# Patient Record
Sex: MALE | Race: WHITE | NOT HISPANIC OR LATINO | Employment: FULL TIME | URBAN - METROPOLITAN AREA
[De-identification: names, ages, dates, MRNs, and addresses within clinical notes are randomized per-mention and may not be internally consistent; named-entity substitution may affect disease eponyms.]

---

## 2023-04-27 LAB
ALANINE AMINOTRANSFERASE (SGPT) (U/L) IN SER/PLAS: 20 U/L (ref 10–52)
ALBUMIN (G/DL) IN SER/PLAS: 4.3 G/DL (ref 3.4–5)
ALKALINE PHOSPHATASE (U/L) IN SER/PLAS: 38 U/L (ref 33–136)
ANION GAP IN SER/PLAS: 11 MMOL/L (ref 10–20)
ASPARTATE AMINOTRANSFERASE (SGOT) (U/L) IN SER/PLAS: 23 U/L (ref 9–39)
BASOPHILS (10*3/UL) IN BLOOD BY AUTOMATED COUNT: 0.06 X10E9/L (ref 0–0.1)
BASOPHILS/100 LEUKOCYTES IN BLOOD BY AUTOMATED COUNT: 1.2 % (ref 0–2)
BILIRUBIN TOTAL (MG/DL) IN SER/PLAS: 0.4 MG/DL (ref 0–1.2)
CALCIUM (MG/DL) IN SER/PLAS: 8.8 MG/DL (ref 8.6–10.3)
CARBON DIOXIDE, TOTAL (MMOL/L) IN SER/PLAS: 25 MMOL/L (ref 21–32)
CHLORIDE (MMOL/L) IN SER/PLAS: 105 MMOL/L (ref 98–107)
CHOLESTEROL (MG/DL) IN SER/PLAS: 137 MG/DL (ref 0–199)
CHOLESTEROL IN HDL (MG/DL) IN SER/PLAS: 45.7 MG/DL
CHOLESTEROL/HDL RATIO: 3
CREATININE (MG/DL) IN SER/PLAS: 0.88 MG/DL (ref 0.5–1.3)
EOSINOPHILS (10*3/UL) IN BLOOD BY AUTOMATED COUNT: 0.09 X10E9/L (ref 0–0.7)
EOSINOPHILS/100 LEUKOCYTES IN BLOOD BY AUTOMATED COUNT: 1.7 % (ref 0–6)
ERYTHROCYTE DISTRIBUTION WIDTH (RATIO) BY AUTOMATED COUNT: 12.9 % (ref 11.5–14.5)
ERYTHROCYTE MEAN CORPUSCULAR HEMOGLOBIN CONCENTRATION (G/DL) BY AUTOMATED: 32.3 G/DL (ref 32–36)
ERYTHROCYTE MEAN CORPUSCULAR VOLUME (FL) BY AUTOMATED COUNT: 98 FL (ref 80–100)
ERYTHROCYTES (10*6/UL) IN BLOOD BY AUTOMATED COUNT: 4.04 X10E12/L (ref 4.5–5.9)
GFR MALE: >90 ML/MIN/1.73M2
GLUCOSE (MG/DL) IN SER/PLAS: 89 MG/DL (ref 74–99)
HEMATOCRIT (%) IN BLOOD BY AUTOMATED COUNT: 39.6 % (ref 41–52)
HEMOGLOBIN (G/DL) IN BLOOD: 12.8 G/DL (ref 13.5–17.5)
IMMATURE GRANULOCYTES/100 LEUKOCYTES IN BLOOD BY AUTOMATED COUNT: 0.2 % (ref 0–0.9)
LDL: 70 MG/DL (ref 0–99)
LEUKOCYTES (10*3/UL) IN BLOOD BY AUTOMATED COUNT: 5.2 X10E9/L (ref 4.4–11.3)
LYMPHOCYTES (10*3/UL) IN BLOOD BY AUTOMATED COUNT: 1.19 X10E9/L (ref 1.2–4.8)
LYMPHOCYTES/100 LEUKOCYTES IN BLOOD BY AUTOMATED COUNT: 23 % (ref 13–44)
MONOCYTES (10*3/UL) IN BLOOD BY AUTOMATED COUNT: 0.39 X10E9/L (ref 0.1–1)
MONOCYTES/100 LEUKOCYTES IN BLOOD BY AUTOMATED COUNT: 7.5 % (ref 2–10)
NEUTROPHILS (10*3/UL) IN BLOOD BY AUTOMATED COUNT: 3.43 X10E9/L (ref 1.2–7.7)
NEUTROPHILS/100 LEUKOCYTES IN BLOOD BY AUTOMATED COUNT: 66.4 % (ref 40–80)
PLATELETS (10*3/UL) IN BLOOD AUTOMATED COUNT: 183 X10E9/L (ref 150–450)
POTASSIUM (MMOL/L) IN SER/PLAS: 3.7 MMOL/L (ref 3.5–5.3)
PROTEIN TOTAL: 6.4 G/DL (ref 6.4–8.2)
SODIUM (MMOL/L) IN SER/PLAS: 137 MMOL/L (ref 136–145)
THYROTROPIN (MIU/L) IN SER/PLAS BY DETECTION LIMIT <= 0.05 MIU/L: 1.08 MIU/L (ref 0.44–3.98)
TRIGLYCERIDE (MG/DL) IN SER/PLAS: 107 MG/DL (ref 0–149)
UREA NITROGEN (MG/DL) IN SER/PLAS: 15 MG/DL (ref 6–23)
VLDL: 21 MG/DL (ref 0–40)

## 2023-08-07 LAB
CREATININE (MG/DL) IN SER/PLAS: 0.89 MG/DL (ref 0.5–1.3)
GFR MALE: >90 ML/MIN/1.73M2

## 2023-08-08 LAB
ESTIMATED AVERAGE GLUCOSE FOR HBA1C: 103 MG/DL
FOLLITROPIN (IU/L) IN SER/PLAS: 4 IU/L
HEMOGLOBIN A1C/HEMOGLOBIN TOTAL IN BLOOD: 5.2 %
LUTEINIZING HORMONE (IU/ML) IN SER/PLAS: 2.7 IU/L
PROLACTIN (UG/L) IN SER/PLAS: 5.2 UG/L (ref 2–18)
TESTOSTERONE (NG/DL) IN SER/PLAS: 495 NG/DL (ref 240–1000)

## 2023-09-10 PROBLEM — N52.9 ERECTILE DYSFUNCTION: Status: ACTIVE | Noted: 2023-09-10

## 2023-09-10 PROBLEM — E78.2 MIXED HYPERLIPIDEMIA: Status: ACTIVE | Noted: 2023-09-10

## 2023-09-10 PROBLEM — Z85.038 HISTORY OF COLON CANCER: Status: ACTIVE | Noted: 2023-09-10

## 2023-09-10 PROBLEM — H93.13 TINNITUS OF BOTH EARS: Status: ACTIVE | Noted: 2023-09-10

## 2023-09-10 PROBLEM — F41.9 ANXIETY: Status: ACTIVE | Noted: 2023-09-10

## 2023-09-10 PROBLEM — E78.1 HYPERTRIGLYCERIDEMIA: Status: ACTIVE | Noted: 2023-09-10

## 2023-09-10 PROBLEM — K76.0 FATTY LIVER: Status: ACTIVE | Noted: 2023-09-10

## 2023-09-10 PROBLEM — Z86.2 HISTORY OF ANEMIA: Status: ACTIVE | Noted: 2023-09-10

## 2023-09-10 PROBLEM — F40.243 FEAR OF FLYING: Status: ACTIVE | Noted: 2023-09-10

## 2023-09-10 RX ORDER — PRIMIDONE 50 MG/1
TABLET ORAL NIGHTLY
COMMUNITY
End: 2023-10-31 | Stop reason: ALTCHOICE

## 2023-09-10 RX ORDER — FERROUS SULFATE 325(65) MG
65 TABLET ORAL 2 TIMES DAILY
COMMUNITY
End: 2023-12-08 | Stop reason: WASHOUT

## 2023-09-10 RX ORDER — FLUDROCORTISONE ACETATE 0.1 MG/1
0.1 TABLET ORAL 2 TIMES DAILY
COMMUNITY
End: 2023-10-31

## 2023-09-10 RX ORDER — TADALAFIL 20 MG/1
TABLET ORAL
COMMUNITY
Start: 2023-07-25 | End: 2024-05-06 | Stop reason: ALTCHOICE

## 2023-09-10 RX ORDER — ATORVASTATIN CALCIUM 10 MG/1
10 TABLET, FILM COATED ORAL DAILY
COMMUNITY
End: 2024-04-24

## 2023-09-10 RX ORDER — FINASTERIDE 5 MG/1
1 TABLET, FILM COATED ORAL DAILY
COMMUNITY
End: 2023-10-31

## 2023-09-10 RX ORDER — OMEGA-3 FATTY ACIDS 1000 MG
1 CAPSULE ORAL DAILY
COMMUNITY

## 2023-09-10 RX ORDER — VENLAFAXINE HYDROCHLORIDE 150 MG/1
150 CAPSULE, EXTENDED RELEASE ORAL DAILY
COMMUNITY
End: 2023-10-31

## 2023-10-31 ENCOUNTER — TELEMEDICINE (OUTPATIENT)
Dept: UROLOGY | Facility: CLINIC | Age: 63
End: 2023-10-31
Payer: COMMERCIAL

## 2023-10-31 ENCOUNTER — LAB (OUTPATIENT)
Dept: LAB | Facility: LAB | Age: 63
End: 2023-10-31
Payer: COMMERCIAL

## 2023-10-31 VITALS — BODY MASS INDEX: 25.8 KG/M2 | WEIGHT: 190.26 LBS

## 2023-10-31 DIAGNOSIS — N48.6 PEYRONIE'S DISEASE: ICD-10-CM

## 2023-10-31 DIAGNOSIS — N52.9 ERECTILE DYSFUNCTION, UNSPECIFIED ERECTILE DYSFUNCTION TYPE: Primary | ICD-10-CM

## 2023-10-31 DIAGNOSIS — N52.9 ERECTILE DYSFUNCTION, UNSPECIFIED ERECTILE DYSFUNCTION TYPE: ICD-10-CM

## 2023-10-31 LAB
ANION GAP SERPL CALC-SCNC: 15 MMOL/L (ref 10–20)
BUN SERPL-MCNC: 16 MG/DL (ref 6–23)
CALCIUM SERPL-MCNC: 9.4 MG/DL (ref 8.6–10.3)
CHLORIDE SERPL-SCNC: 103 MMOL/L (ref 98–107)
CO2 SERPL-SCNC: 25 MMOL/L (ref 21–32)
CREAT SERPL-MCNC: 0.83 MG/DL (ref 0.5–1.3)
GFR SERPL CREATININE-BSD FRML MDRD: >90 ML/MIN/1.73M*2
GLUCOSE SERPL-MCNC: 85 MG/DL (ref 74–99)
POTASSIUM SERPL-SCNC: 3.7 MMOL/L (ref 3.5–5.3)
SODIUM SERPL-SCNC: 139 MMOL/L (ref 136–145)

## 2023-10-31 PROCEDURE — 36415 COLL VENOUS BLD VENIPUNCTURE: CPT

## 2023-10-31 PROCEDURE — 99214 OFFICE O/P EST MOD 30 MIN: CPT | Performed by: UROLOGY

## 2023-10-31 PROCEDURE — 80048 BASIC METABOLIC PNL TOTAL CA: CPT

## 2023-10-31 RX ORDER — GABAPENTIN 300 MG/1
600 CAPSULE ORAL ONCE
Status: CANCELLED | OUTPATIENT
Start: 2023-10-31 | End: 2023-10-31

## 2023-10-31 RX ORDER — ACETAMINOPHEN 325 MG/1
975 TABLET ORAL ONCE
Status: CANCELLED | OUTPATIENT
Start: 2023-10-31 | End: 2023-10-31

## 2023-10-31 RX ORDER — CELECOXIB 50 MG/1
400 CAPSULE ORAL ONCE
Status: CANCELLED | OUTPATIENT
Start: 2023-10-31 | End: 2023-10-31

## 2023-10-31 RX ORDER — CHLORHEXIDINE GLUCONATE 40 MG/ML
SOLUTION TOPICAL DAILY PRN
Status: CANCELLED | OUTPATIENT
Start: 2023-10-31

## 2023-10-31 RX ORDER — SODIUM CHLORIDE 9 MG/ML
100 INJECTION, SOLUTION INTRAVENOUS CONTINUOUS
Status: CANCELLED | OUTPATIENT
Start: 2023-10-31

## 2023-10-31 NOTE — PROGRESS NOTES
Virtual or Telephone Consent    An interactive audio and video telecommunication system which permits real time communications between the patient (at the originating site) and provider (at the distant site) was utilized to provide this telehealth service.   Verbal consent was requested and obtained from Behzad Gilmore on this date, 10/31/23 for a telehealth visit.     HPI  64 yo M referred by Dr. Patel for erectile dysfunction. S/P vasectomy in 2004. Sp TURP 10/2020.     Last visit 8/30/23:  -IPP counseling  -continue Cialis 20mg prn  -discussed penile plication   -if he responds well to Cialis, penile plication will be a better option  -if not penile implant with modeling is his best option      Todays Visit:  #Erectile Dysfunction   -Cialis 20mg --> not working  -continue with IPP  -okay with blood if needed  -Full code       -Hernia Repair? yes 2004, umbilical repair with mesh  -Pain or curvature in penis when erect: curves to left     Patient states he had colon cancer 2017, after catheter 2 different times for 30 days and has had symptoms ever since     Current Medications:  Current Outpatient Medications   Medication Sig Dispense Refill    atorvastatin (Lipitor) 10 mg tablet Take 1 tablet (10 mg) by mouth once daily.      coenzyme Q10 150 mg capsule Take 1 capsule (150 mg) by mouth once daily. With a meal      ferrous sulfate 325 (65 Fe) MG tablet Take 1 tablet (65 mg of iron) by mouth twice a day.      finasteride (Proscar) 5 mg tablet Take 1 tablet (5 mg) by mouth once daily.      fludrocortisone (Florinef) 0.1 mg tablet Take 1 tablet (0.1 mg) by mouth twice a day.      omega-3 (Super Omega-3) 1,000 mg capsule capsule 1 capsule (1,000 mg) once daily.      primidone (Mysoline) 50 mg tablet Take by mouth once daily at bedtime.      tadalafil 20 mg tablet start with ONE-HALF tablet BY MOUTH as needed 2 (TWO) hours prior to activity. May increase to 1 (ONE) tab as needed 2 (TWO) hours prior to activity.       venlafaxine XR (Effexor XR) 150 mg 24 hr capsule Take 1 capsule (150 mg) by mouth once daily.       No current facility-administered medications for this visit.        PMH:  No past medical history on file.    PSH:  No past surgical history on file.    FMH:  No family history on file.    Allergies:  Allergies   Allergen Reactions    Penicillin Hives and Itching       Physical Exam  Constitutional:       Appearance: Normal appearance.   HENT:      Head: Normocephalic and atraumatic.      Nose: Nose normal.   Pulmonary:      Effort: No respiratory distress.   Abdominal:      General: There is no distension.   Neurological:      Mental Status: He is alert.        Assesment/Plan  Severe ED refractory to ICI and pills, and significant peyronies disease.   We had a long discussion regarding penile prosthesis, including risks, benefits, and alternatives. We discussed risks including but not limited to pain, bleeding, infection, damage to adjacent structures, need for further procedures, malfunction, erosion, extrusion, complications of anesthesia etc. We discussed the postoperative course and expectations, and specifically that after getting an implant, other methods such as injections etc are no longer effective. We also discussed the effect of the implant on length and that it will likely be shorter than previous given age and duration of ED. Further, if he gets an infection and the implant has to be removed, there is potential for him to never have erections again. All questions were answered and reading materials were given.   -continue with coloplast IPP  -penoscrotal approach  -IPP modeling with additional indicated procedures  -vancomycin / gentamicin   -traditional reservoir placement  -will not leave appiah cath in overnight as he has had prior TURP    Follow up with pre-procedural appts    Scribe Attestation  By signing my name below, IJaneth-Jitendra , Scribe   attest that this documentation has been  prepared under the direction and in the presence of Kristen Gross MD.

## 2023-11-30 ENCOUNTER — OFFICE VISIT (OUTPATIENT)
Dept: UROLOGY | Facility: CLINIC | Age: 63
End: 2023-11-30
Payer: COMMERCIAL

## 2023-11-30 VITALS — HEIGHT: 72 IN | WEIGHT: 190 LBS | BODY MASS INDEX: 25.73 KG/M2 | TEMPERATURE: 97.7 F

## 2023-11-30 DIAGNOSIS — N52.9 ERECTILE DYSFUNCTION, UNSPECIFIED ERECTILE DYSFUNCTION TYPE: Primary | ICD-10-CM

## 2023-11-30 PROCEDURE — 99213 OFFICE O/P EST LOW 20 MIN: CPT | Performed by: NURSE PRACTITIONER

## 2023-11-30 PROCEDURE — 1036F TOBACCO NON-USER: CPT | Performed by: NURSE PRACTITIONER

## 2023-11-30 RX ORDER — SULFAMETHOXAZOLE AND TRIMETHOPRIM 800; 160 MG/1; MG/1
1 TABLET ORAL 2 TIMES DAILY
Qty: 14 TABLET | Refills: 0 | Status: SHIPPED | OUTPATIENT
Start: 2023-11-30 | End: 2023-12-14

## 2023-11-30 ASSESSMENT — ENCOUNTER SYMPTOMS
LOSS OF SENSATION IN FEET: 0
DEPRESSION: 0
OCCASIONAL FEELINGS OF UNSTEADINESS: 0

## 2023-11-30 ASSESSMENT — PAIN SCALES - GENERAL: PAINLEVEL: 0-NO PAIN

## 2023-11-30 ASSESSMENT — PATIENT HEALTH QUESTIONNAIRE - PHQ9
2. FEELING DOWN, DEPRESSED OR HOPELESS: NOT AT ALL
1. LITTLE INTEREST OR PLEASURE IN DOING THINGS: NOT AT ALL
SUM OF ALL RESPONSES TO PHQ9 QUESTIONS 1 AND 2: 0

## 2023-11-30 ASSESSMENT — COLUMBIA-SUICIDE SEVERITY RATING SCALE - C-SSRS
6. HAVE YOU EVER DONE ANYTHING, STARTED TO DO ANYTHING, OR PREPARED TO DO ANYTHING TO END YOUR LIFE?: NO
1. IN THE PAST MONTH, HAVE YOU WISHED YOU WERE DEAD OR WISHED YOU COULD GO TO SLEEP AND NOT WAKE UP?: NO
2. HAVE YOU ACTUALLY HAD ANY THOUGHTS OF KILLING YOURSELF?: NO

## 2023-11-30 NOTE — PROGRESS NOTES
[facility]  Outpatient Clinic Note      Patient: Behzad Gilmore  Age/Sex: 63 y.o., male  MRN: [id number]      History of Present Illness  63-year-old gentleman with ED pending IPP with Dr. Kristen Gross. He has no complaints today.    Past Medical & Surgical History  No past medical history on file.   No past surgical history on file.       Labs  NA    Medications:  Current Outpatient Medications on File Prior to Visit   Medication Sig Dispense Refill    atorvastatin (Lipitor) 10 mg tablet Take 1 tablet (10 mg) by mouth once daily.      coenzyme Q10 150 mg capsule Take 1 capsule (150 mg) by mouth once daily. With a meal      ferrous sulfate 325 (65 Fe) MG tablet Take 1 tablet (65 mg of iron) by mouth twice a day.      omega-3 (Super Omega-3) 1,000 mg capsule capsule 1 capsule (1,000 mg) once daily.      tadalafil 20 mg tablet start with ONE-HALF tablet BY MOUTH as needed 2 (TWO) hours prior to activity. May increase to 1 (ONE) tab as needed 2 (TWO) hours prior to activity.       No current facility-administered medications on file prior to visit.          Physical Exam                                                                                                                      General: Well developed, well nourished, alert and cooperative, appears in no acute distress  Eyes: Non-injected conjunctiva, sclera clear, no proptosis  Cardiac: Extremities are warm and well perfused. No edema, cyanosis or pallor.   Lungs: Breathing is easy, non-labored. Speaking in clear and complete sentences. Normal diaphragmatic movement.  MSK: Ambulatory with steady gait, unassisted  Neuro: alert and oriented to person, place and time  Psych: Demonstrates good judgement and reason, without hallucinations, abnormal affect or abnormal behaviors.  Skin: no obvious lesions, no rashes      Review of Systems  Review of Systems       Imaging  NA      Assessment & Plan  63-year-old gentleman with ED pending IPP with   Kristen Gross. He has no complaints today.    We reviewed all pertinent laboratory work and imaging as it pertains to patient's upcoming surgery.  We discussed the risks, benefits and alternatives to patient's upcoming surgery.  We discussed the post operative course including incision care, drain care, and catheter care. We had an opportunity to review IPP including pump, reservoir and cylinders. We reviewed use and indication of preoperative antibiotic, of which he verbalized understanding. All questions were addressed and answered appropriately.  Patient feels comfortable following this teaching visit.        Reviewed and approved by CIRO HOROWITZ on 11/30/23 at 9:12 AM.

## 2023-11-30 NOTE — H&P (VIEW-ONLY)
[facility]  Outpatient Clinic Note      Patient: Behzad Gilmore  Age/Sex: 63 y.o., male  MRN: [id number]      History of Present Illness  63-year-old gentleman with ED pending IPP with Dr. Kristen Gross. He has no complaints today.    Past Medical & Surgical History  No past medical history on file.   No past surgical history on file.       Labs  NA    Medications:  Current Outpatient Medications on File Prior to Visit   Medication Sig Dispense Refill    atorvastatin (Lipitor) 10 mg tablet Take 1 tablet (10 mg) by mouth once daily.      coenzyme Q10 150 mg capsule Take 1 capsule (150 mg) by mouth once daily. With a meal      ferrous sulfate 325 (65 Fe) MG tablet Take 1 tablet (65 mg of iron) by mouth twice a day.      omega-3 (Super Omega-3) 1,000 mg capsule capsule 1 capsule (1,000 mg) once daily.      tadalafil 20 mg tablet start with ONE-HALF tablet BY MOUTH as needed 2 (TWO) hours prior to activity. May increase to 1 (ONE) tab as needed 2 (TWO) hours prior to activity.       No current facility-administered medications on file prior to visit.          Physical Exam                                                                                                                      General: Well developed, well nourished, alert and cooperative, appears in no acute distress  Eyes: Non-injected conjunctiva, sclera clear, no proptosis  Cardiac: Extremities are warm and well perfused. No edema, cyanosis or pallor.   Lungs: Breathing is easy, non-labored. Speaking in clear and complete sentences. Normal diaphragmatic movement.  MSK: Ambulatory with steady gait, unassisted  Neuro: alert and oriented to person, place and time  Psych: Demonstrates good judgement and reason, without hallucinations, abnormal affect or abnormal behaviors.  Skin: no obvious lesions, no rashes      Review of Systems  Review of Systems       Imaging  NA      Assessment & Plan  63-year-old gentleman with ED pending IPP with   Kristen Gross. He has no complaints today.    We reviewed all pertinent laboratory work and imaging as it pertains to patient's upcoming surgery.  We discussed the risks, benefits and alternatives to patient's upcoming surgery.  We discussed the post operative course including incision care, drain care, and catheter care. We had an opportunity to review IPP including pump, reservoir and cylinders. We reviewed use and indication of preoperative antibiotic, of which he verbalized understanding. All questions were addressed and answered appropriately.  Patient feels comfortable following this teaching visit.        Reviewed and approved by CIRO HOROWITZ on 11/30/23 at 9:12 AM.

## 2023-12-07 ENCOUNTER — TELEPHONE (OUTPATIENT)
Dept: PREADMISSION TESTING | Facility: HOSPITAL | Age: 63
End: 2023-12-07
Payer: COMMERCIAL

## 2023-12-08 ENCOUNTER — PRE-ADMISSION TESTING (OUTPATIENT)
Dept: PREADMISSION TESTING | Facility: HOSPITAL | Age: 63
End: 2023-12-08
Payer: COMMERCIAL

## 2023-12-08 ENCOUNTER — LAB (OUTPATIENT)
Dept: LAB | Facility: LAB | Age: 63
End: 2023-12-08
Payer: COMMERCIAL

## 2023-12-08 VITALS
RESPIRATION RATE: 18 BRPM | DIASTOLIC BLOOD PRESSURE: 72 MMHG | OXYGEN SATURATION: 97 % | SYSTOLIC BLOOD PRESSURE: 106 MMHG | TEMPERATURE: 95.9 F | HEIGHT: 71 IN | BODY MASS INDEX: 24.1 KG/M2 | HEART RATE: 66 BPM | WEIGHT: 172.18 LBS

## 2023-12-08 DIAGNOSIS — Z01.818 PREOP TESTING: ICD-10-CM

## 2023-12-08 DIAGNOSIS — Z01.818 PREOP TESTING: Primary | ICD-10-CM

## 2023-12-08 LAB
BASOPHILS # BLD AUTO: 0.06 X10*3/UL (ref 0–0.1)
BASOPHILS NFR BLD AUTO: 1.1 %
EOSINOPHIL # BLD AUTO: 0.11 X10*3/UL (ref 0–0.7)
EOSINOPHIL NFR BLD AUTO: 2 %
ERYTHROCYTE [DISTWIDTH] IN BLOOD BY AUTOMATED COUNT: 12.4 % (ref 11.5–14.5)
HCT VFR BLD AUTO: 42 % (ref 41–52)
HGB BLD-MCNC: 13.9 G/DL (ref 13.5–17.5)
IMM GRANULOCYTES # BLD AUTO: 0.01 X10*3/UL (ref 0–0.7)
IMM GRANULOCYTES NFR BLD AUTO: 0.2 % (ref 0–0.9)
LYMPHOCYTES # BLD AUTO: 1.19 X10*3/UL (ref 1.2–4.8)
LYMPHOCYTES NFR BLD AUTO: 22 %
MCH RBC QN AUTO: 31.1 PG (ref 26–34)
MCHC RBC AUTO-ENTMCNC: 33.1 G/DL (ref 32–36)
MCV RBC AUTO: 94 FL (ref 80–100)
MONOCYTES # BLD AUTO: 0.48 X10*3/UL (ref 0.1–1)
MONOCYTES NFR BLD AUTO: 8.9 %
NEUTROPHILS # BLD AUTO: 3.55 X10*3/UL (ref 1.2–7.7)
NEUTROPHILS NFR BLD AUTO: 65.8 %
NRBC BLD-RTO: 0 /100 WBCS (ref 0–0)
PLATELET # BLD AUTO: 195 X10*3/UL (ref 150–450)
RBC # BLD AUTO: 4.47 X10*6/UL (ref 4.5–5.9)
WBC # BLD AUTO: 5.4 X10*3/UL (ref 4.4–11.3)

## 2023-12-08 PROCEDURE — 87081 CULTURE SCREEN ONLY: CPT | Mod: AHULAB | Performed by: PHYSICIAN ASSISTANT

## 2023-12-08 PROCEDURE — 85025 COMPLETE CBC W/AUTO DIFF WBC: CPT

## 2023-12-08 PROCEDURE — 36415 COLL VENOUS BLD VENIPUNCTURE: CPT

## 2023-12-08 PROCEDURE — 99204 OFFICE O/P NEW MOD 45 MIN: CPT | Performed by: PHYSICIAN ASSISTANT

## 2023-12-08 RX ORDER — CHLORHEXIDINE GLUCONATE ORAL RINSE 1.2 MG/ML
SOLUTION DENTAL
Qty: 473 ML | Refills: 0 | Status: SHIPPED | OUTPATIENT
Start: 2023-12-08 | End: 2024-05-06 | Stop reason: ALTCHOICE

## 2023-12-08 ASSESSMENT — ENCOUNTER SYMPTOMS
NEUROLOGICAL NEGATIVE: 1
EYES NEGATIVE: 1
ENDOCRINE NEGATIVE: 1
CONSTITUTIONAL NEGATIVE: 1
MUSCULOSKELETAL NEGATIVE: 1
RESPIRATORY NEGATIVE: 1
PSYCHIATRIC NEGATIVE: 1
CARDIOVASCULAR NEGATIVE: 1
ALLERGIC/IMMUNOLOGIC NEGATIVE: 1
HEMATOLOGIC/LYMPHATIC NEGATIVE: 1
GASTROINTESTINAL NEGATIVE: 1

## 2023-12-08 NOTE — PREPROCEDURE INSTRUCTIONS
Medication List            Accurate as of December 8, 2023 10:20 AM. Always use your most recent med list.                atorvastatin 10 mg tablet  Commonly known as: Lipitor  Medication Adjustments for Surgery: Take morning of surgery with sip of water, no other fluids     coenzyme Q10 150 mg capsule  Medication Adjustments for Surgery: Stop 7 days before surgery     Super Omega-3 1,000 mg capsule capsule  Generic drug: omega-3  Medication Adjustments for Surgery: Stop 7 days before surgery     tadalafil 20 mg tablet  Commonly known as: Cialis  Medication Adjustments for Surgery: Stop 3 days before surgery                 CONTACT SURGEON'S OFFICE IF YOU DEVELOP:  * Fever = 100.4 F   * New respiratory symptoms (e.g. cough, shortness of breath, respiratory distress, sore throat)  * Recent loss of taste or smell  *Flu like symptoms such as headache, fatigue or gastrointestinal symptoms  * You develop any open sores, shingles, burning or painful urination   AND/OR:  * You no longer wish to have the surgery.  * Any other personal circumstances change that may lead to the need to cancel or defer this surgery.  *You were admitted to any hospital within one week of your planned procedure.    SMOKING:  *Quitting smoking can make a huge difference to your health and recovery from surgery.    *If you need help with quitting, call 7-839-QUIT-NOW.    THE DAY BEFORE SURGERY:  *Do not eat any food after midnight the night before surgery.   *You are permitted to drink clear liquids (i.e. water, black coffee, tea, clear broth, apple juice) up to 2 hours before your surgery.    DIABETICS:  Nothing by mouth (solid or liquid) for 8 hours prior to surgery. Please check fasting blood sugar upon waking up.  If fasting sugar is <80 mg/dl, please drink 100ml/3oz of apple juice no later than 2 hours prior to surgery.      SURGICAL TIME  *You will be contacted between 2 p.m. and 6 p.m. the business day before your surgery with your  arrival time.  *If you haven't received a call by 6pm, call [phone].  *Scheduled surgery times may change and you will be notified if this occurs-check your personal voicemail for any updates.    ON THE MORNING OF SURGERY:  *Wear comfortable, loose fitting clothing.   *Do not use moisturizers, creams, lotions or perfume.  *All jewelry and valuables should be left at home.  *Prosthetic devices such as contact lenses, hearing aids, dentures, eyelash extensions, hairpins and body piercing must be removed before surgery.    BRING WITH YOU:  *Photo ID and insurance card  *Current list of medicines and allergies  *Pacemaker/Defibrillator/Heart stent cards  *CPAP machine and mask  *Slings/splints/crutches  *Copy of your complete Advanced Directive/DHPOA-if applicable  *Neurostimulator implant remote    PARKING AND ARRIVAL:  *Check in at the Main Entrance desk and let them know you are here for surgery.  *You will be directed to the 2nd floor surgical waiting area.    AFTER OUTPATIENT SURGERY:  *A responsible adult MUST accompany you at the time of discharge and stay with you for 24 hours after your surgery.  *You may NOT drive yourself home after surgery.  *You may use a taxi or ride sharing service ([employer], Uber) to return home ONLY if you are accompanied by a friend or family member.  *Instructions for resuming your medications will be provided by your surgeon.

## 2023-12-08 NOTE — CPM/PAT H&P
[facility] Evaluation       Name: Behzad Gilmore (Behzad Gilmore)  /Age: 1960/63 y.o.     In-Person       Chief Complaint: Erectile dysfunction, unspecified erectile dysfunction type [N52.9]  Peyronie's disease     HPI    Date of Consult: 23    Referring Provider: Dr. Gross    Surgery, Date, and Length: Insertion Penile Prosthesis ; 23; 120 minutes    Behzad Gilmore is a 63 year-old male who presents to the [facility] for perioperative risk assessment prior to surgery.  States ED since cancer treatment .  States needed long term catheter during which caused curvature issues.    This note was created in part upon personal review of patient's medical records.      Patient is scheduled to have Insertion Penile Prosthesis     Medical History  Past Medical History:   Diagnosis Date    Anxiety     Colorectal cancer (CMS/HCC) 2016    surgery and chemo    Erectile dysfunction, unspecified erectile dysfunction type     Hyperlipidemia     Peyronie's disease     Vision loss     glasses        STOP BANG = 2    Caprini = 6    Surgical History  Past Surgical History:   Procedure Laterality Date    APPENDECTOMY  2016    COLON SURGERY  2016    righr ascending colon resection    HERNIA REPAIR  2004    umbilical with mesh    OTHER SURGICAL HISTORY Right 1980    knee arthroscopy    PROSTATE SURGERY      TURP    TONSILLECTOMY      2 yo    VASECTOMY            The patient is not a Restoration[employer] and will accept blood and blood products if medically indicated.        Family history:  Family History   Problem Relation Name Age of Onset    Hyperlipidemia Mother      Other (aortic valve replacement) Father      Other (htn) Father      Dementia Father      Alzheimer's disease Sister          Social history:  Social History     Socioeconomic History    Marital status: Single     Spouse name: Not on file    Number of children: Not on file    Years of education: Not on file    Highest education level: Not  "on file   Occupational History    Not on file   Tobacco Use    Smoking status: Never    Smokeless tobacco: Never   Vaping Use    Vaping Use: Never used   Substance and Sexual Activity    Alcohol use: Yes     Comment: once a week    Drug use: Never    Sexual activity: Not on file   Other Topics Concern    Not on file   Social History Narrative    Not on file     Social Determinants of Health     Financial Resource Strain: Not on file   Food Insecurity: Not on file   Transportation Needs: Not on file   Physical Activity: Not on file   Stress: Not on file   Social Connections: Not on file   Intimate Partner Violence: Not on file   Housing Stability: Not on file          Current Outpatient Medications:     atorvastatin (Lipitor) 10 mg tablet, Take 1 tablet (10 mg) by mouth once daily., Disp: , Rfl:     chlorhexidine (Peridex) 0.12 % solution, 15 ml swish and spit for 30 seconds night prior to surgery and morning of surgery, Disp: 473 mL, Rfl: 0    coenzyme Q10 150 mg capsule, Take 1 capsule (150 mg) by mouth once daily. With a meal, Disp: , Rfl:     omega-3 (Super Omega-3) 1,000 mg capsule capsule, 1 capsule (1,000 mg) once daily., Disp: , Rfl:     tadalafil 20 mg tablet, start with ONE-HALF tablet BY MOUTH as needed 2 (TWO) hours prior to activity. May increase to 1 (ONE) tab as needed 2 (TWO) hours prior to activity., Disp: , Rfl:          /72   Pulse 66   Temp 35.5 °C (95.9 °F)   Resp 18   Ht 1.803 m (5' 11\")   Wt 78.1 kg (172 lb 2.9 oz)   SpO2 97%   BMI 24.01 kg/m²      Review of Systems   Constitutional: Negative.    HENT: Negative.     Eyes: Negative.    Respiratory: Negative.     Cardiovascular: Negative.    Gastrointestinal: Negative.    Endocrine: Negative.    Genitourinary: Negative.    Musculoskeletal: Negative.    Skin: Negative.    Allergic/Immunologic: Negative.    Neurological: Negative.    Hematological: Negative.    Psychiatric/Behavioral: Negative.          Physical Exam  Constitutional:  "      Appearance: Normal appearance.   HENT:      Head: Normocephalic and atraumatic.      Right Ear: External ear normal.      Left Ear: External ear normal.      Nose: Nose normal.      Mouth/Throat:      Pharynx: Oropharynx is clear.   Eyes:      Conjunctiva/sclera: Conjunctivae normal.   Cardiovascular:      Rate and Rhythm: Normal rate and regular rhythm.      Heart sounds: Normal heart sounds.   Pulmonary:      Effort: Pulmonary effort is normal.      Breath sounds: Normal breath sounds.   Abdominal:      General: Abdomen is flat.      Palpations: Abdomen is soft.   Musculoskeletal:         General: Normal range of motion.      Cervical back: Normal range of motion and neck supple.   Skin:     General: Skin is warm and dry.   Neurological:      General: No focal deficit present.      Mental Status: He is alert and oriented to person, place, and time.   Psychiatric:         Mood and Affect: Mood normal.         Behavior: Behavior normal.         Thought Content: Thought content normal.         Judgment: Judgment normal.          PAT AIRWAY:   Airway:     Mallampati::  II    Neck ROM::  Full   implants    Lab Results   Component Value Date    WBC 5.4 12/08/2023    HGB 13.9 12/08/2023    HCT 42.0 12/08/2023    MCV 94 12/08/2023     12/08/2023      Lab Results   Component Value Date    GLUCOSE 85 10/31/2023    CALCIUM 9.4 10/31/2023     10/31/2023    K 3.7 10/31/2023    CO2 25 10/31/2023     10/31/2023    BUN 16 10/31/2023    CREATININE 0.83 10/31/2023      RCRI  0  , 3.9 % Risk of MACE    Cardiac  HLD -  continue atorvastatin DOS     Hematology       Patient instructed to ambulate as soon as possible postoperatively to decrease thromboembolic risk.      Initiate mechanical DVT prophylaxis as soon as possible and initiate chemical prophylaxis when deemed safe from a bleeding standpoint post surgery.       VTE prophylaxis per surgical team       Tests ordered in PAT: cbc, bmp, mrsa  LABS REVIEWED:  unremarkable     Risk assessment complete.  Patient is scheduled for a low/intermediate surgical risk procedure.        Preoperative medication instructions were provided and reviewed with the patient.  Any additional testing or evaluation was explained to the patient.  Nothing by mouth instructions were discussed and patient's questions were answered prior to conclusion to this encounter.  Patient verbalized understanding of preoperative instructions given in preadmission testing; discharge instructions available in EMR.    This note was dictated by a speech recognition.  Minor errors may have been detected in a speech recognition.

## 2023-12-10 LAB — STAPHYLOCOCCUS SPEC CULT: ABNORMAL

## 2023-12-14 ENCOUNTER — ANESTHESIA (OUTPATIENT)
Dept: OPERATING ROOM | Facility: HOSPITAL | Age: 63
End: 2023-12-14
Payer: COMMERCIAL

## 2023-12-14 ENCOUNTER — HOSPITAL ENCOUNTER (OUTPATIENT)
Facility: HOSPITAL | Age: 63
Setting detail: OUTPATIENT SURGERY
Discharge: HOME | End: 2023-12-14
Attending: UROLOGY | Admitting: UROLOGY
Payer: COMMERCIAL

## 2023-12-14 ENCOUNTER — ANESTHESIA EVENT (OUTPATIENT)
Dept: OPERATING ROOM | Facility: HOSPITAL | Age: 63
End: 2023-12-14
Payer: COMMERCIAL

## 2023-12-14 VITALS
HEART RATE: 61 BPM | DIASTOLIC BLOOD PRESSURE: 81 MMHG | TEMPERATURE: 97.5 F | RESPIRATION RATE: 16 BRPM | SYSTOLIC BLOOD PRESSURE: 124 MMHG | OXYGEN SATURATION: 99 %

## 2023-12-14 DIAGNOSIS — N52.9 ERECTILE DYSFUNCTION, UNSPECIFIED ERECTILE DYSFUNCTION TYPE: Primary | ICD-10-CM

## 2023-12-14 PROCEDURE — 2500000005 HC RX 250 GENERAL PHARMACY W/O HCPCS: Performed by: NURSE ANESTHETIST, CERTIFIED REGISTERED

## 2023-12-14 PROCEDURE — 54235 NJX CORPORA CAVERNOSA RX AGT: CPT | Performed by: UROLOGY

## 2023-12-14 PROCEDURE — 3600000008 HC OR TIME - EACH INCREMENTAL 1 MINUTE - PROCEDURE LEVEL THREE: Performed by: UROLOGY

## 2023-12-14 PROCEDURE — A54401 PR INSERT SELF-CONTD PENILE PROSTHESIS: Performed by: ANESTHESIOLOGY

## 2023-12-14 PROCEDURE — 54110 TREATMENT OF PENIS LESION: CPT | Performed by: UROLOGY

## 2023-12-14 PROCEDURE — 2500000005 HC RX 250 GENERAL PHARMACY W/O HCPCS: Performed by: UROLOGY

## 2023-12-14 PROCEDURE — 7100000009 HC PHASE TWO TIME - INITIAL BASE CHARGE: Performed by: UROLOGY

## 2023-12-14 PROCEDURE — 3600000003 HC OR TIME - INITIAL BASE CHARGE - PROCEDURE LEVEL THREE: Performed by: UROLOGY

## 2023-12-14 PROCEDURE — C1813 PROSTHESIS, PENILE, INFLATAB: HCPCS | Performed by: UROLOGY

## 2023-12-14 PROCEDURE — 2780000003 HC OR 278 NO HCPCS: Performed by: UROLOGY

## 2023-12-14 PROCEDURE — 3700000001 HC GENERAL ANESTHESIA TIME - INITIAL BASE CHARGE: Performed by: UROLOGY

## 2023-12-14 PROCEDURE — A4217 STERILE WATER/SALINE, 500 ML: HCPCS | Performed by: UROLOGY

## 2023-12-14 PROCEDURE — 2500000004 HC RX 250 GENERAL PHARMACY W/ HCPCS (ALT 636 FOR OP/ED): Performed by: NURSE ANESTHETIST, CERTIFIED REGISTERED

## 2023-12-14 PROCEDURE — 2500000004 HC RX 250 GENERAL PHARMACY W/ HCPCS (ALT 636 FOR OP/ED): Performed by: UROLOGY

## 2023-12-14 PROCEDURE — 3700000002 HC GENERAL ANESTHESIA TIME - EACH INCREMENTAL 1 MINUTE: Performed by: UROLOGY

## 2023-12-14 PROCEDURE — 2500000004 HC RX 250 GENERAL PHARMACY W/ HCPCS (ALT 636 FOR OP/ED): Performed by: ANESTHESIOLOGY

## 2023-12-14 PROCEDURE — 7100000010 HC PHASE TWO TIME - EACH INCREMENTAL 1 MINUTE: Performed by: UROLOGY

## 2023-12-14 PROCEDURE — 2720000007 HC OR 272 NO HCPCS: Performed by: UROLOGY

## 2023-12-14 PROCEDURE — 2500000001 HC RX 250 WO HCPCS SELF ADMINISTERED DRUGS (ALT 637 FOR MEDICARE OP): Performed by: UROLOGY

## 2023-12-14 PROCEDURE — A54401 PR INSERT SELF-CONTD PENILE PROSTHESIS: Performed by: NURSE ANESTHETIST, CERTIFIED REGISTERED

## 2023-12-14 PROCEDURE — 7100000002 HC RECOVERY ROOM TIME - EACH INCREMENTAL 1 MINUTE: Performed by: UROLOGY

## 2023-12-14 PROCEDURE — 7100000001 HC RECOVERY ROOM TIME - INITIAL BASE CHARGE: Performed by: UROLOGY

## 2023-12-14 PROCEDURE — 54360 PENIS PLASTIC SURGERY: CPT | Performed by: UROLOGY

## 2023-12-14 PROCEDURE — 54405 INSERT MULTI-COMP PENIS PROS: CPT | Performed by: UROLOGY

## 2023-12-14 PROCEDURE — 2500000005 HC RX 250 GENERAL PHARMACY W/O HCPCS: Performed by: ANESTHESIOLOGY

## 2023-12-14 DEVICE — IMPLANTABLE DEVICE: Type: IMPLANTABLE DEVICE | Site: PENIS | Status: FUNCTIONAL

## 2023-12-14 DEVICE — RESERVOIR, TITAN CL, WITH LOCK OUT VALVE, 125CC: Type: IMPLANTABLE DEVICE | Site: PENIS | Status: FUNCTIONAL

## 2023-12-14 RX ORDER — LIDOCAINE HYDROCHLORIDE 20 MG/ML
INJECTION, SOLUTION INFILTRATION; PERINEURAL AS NEEDED
Status: DISCONTINUED | OUTPATIENT
Start: 2023-12-14 | End: 2023-12-14

## 2023-12-14 RX ORDER — DEXAMETHASONE SODIUM PHOSPHATE 4 MG/ML
INJECTION, SOLUTION INTRA-ARTICULAR; INTRALESIONAL; INTRAMUSCULAR; INTRAVENOUS; SOFT TISSUE AS NEEDED
Status: DISCONTINUED | OUTPATIENT
Start: 2023-12-14 | End: 2023-12-14

## 2023-12-14 RX ORDER — ACETAMINOPHEN 325 MG/1
975 TABLET ORAL ONCE
Status: COMPLETED | OUTPATIENT
Start: 2023-12-14 | End: 2023-12-14

## 2023-12-14 RX ORDER — SODIUM CHLORIDE 9 MG/ML
100 INJECTION, SOLUTION INTRAVENOUS CONTINUOUS
Status: DISCONTINUED | OUTPATIENT
Start: 2023-12-14 | End: 2023-12-14 | Stop reason: HOSPADM

## 2023-12-14 RX ORDER — GABAPENTIN 300 MG/1
600 CAPSULE ORAL ONCE
Status: COMPLETED | OUTPATIENT
Start: 2023-12-14 | End: 2023-12-14

## 2023-12-14 RX ORDER — PROPOFOL 10 MG/ML
INJECTION, EMULSION INTRAVENOUS AS NEEDED
Status: DISCONTINUED | OUTPATIENT
Start: 2023-12-14 | End: 2023-12-14

## 2023-12-14 RX ORDER — POLYETHYLENE GLYCOL 3350 17 G/17G
17 POWDER, FOR SOLUTION ORAL DAILY
Qty: 7 PACKET | Refills: 0 | Status: SHIPPED | OUTPATIENT
Start: 2023-12-14 | End: 2023-12-21

## 2023-12-14 RX ORDER — SODIUM CHLORIDE, SODIUM LACTATE, POTASSIUM CHLORIDE, CALCIUM CHLORIDE 600; 310; 30; 20 MG/100ML; MG/100ML; MG/100ML; MG/100ML
100 INJECTION, SOLUTION INTRAVENOUS CONTINUOUS
Status: DISCONTINUED | OUTPATIENT
Start: 2023-12-14 | End: 2023-12-14 | Stop reason: HOSPADM

## 2023-12-14 RX ORDER — CHLORHEXIDINE GLUCONATE 40 MG/ML
SOLUTION TOPICAL DAILY PRN
Status: DISCONTINUED | OUTPATIENT
Start: 2023-12-14 | End: 2023-12-14 | Stop reason: HOSPADM

## 2023-12-14 RX ORDER — PHENYLEPHRINE HCL IN 0.9% NACL 0.4MG/10ML
SYRINGE (ML) INTRAVENOUS AS NEEDED
Status: DISCONTINUED | OUTPATIENT
Start: 2023-12-14 | End: 2023-12-14

## 2023-12-14 RX ORDER — MIDAZOLAM HYDROCHLORIDE 1 MG/ML
INJECTION INTRAMUSCULAR; INTRAVENOUS AS NEEDED
Status: DISCONTINUED | OUTPATIENT
Start: 2023-12-14 | End: 2023-12-14

## 2023-12-14 RX ORDER — LIDOCAINE HYDROCHLORIDE 10 MG/ML
INJECTION INFILTRATION; PERINEURAL AS NEEDED
Status: DISCONTINUED | OUTPATIENT
Start: 2023-12-14 | End: 2023-12-14 | Stop reason: HOSPADM

## 2023-12-14 RX ORDER — OXYCODONE HYDROCHLORIDE 5 MG/1
5 TABLET ORAL EVERY 6 HOURS PRN
Qty: 20 TABLET | Refills: 0 | Status: SHIPPED | OUTPATIENT
Start: 2023-12-14 | End: 2023-12-19

## 2023-12-14 RX ORDER — ONDANSETRON HYDROCHLORIDE 2 MG/ML
4 INJECTION, SOLUTION INTRAVENOUS ONCE AS NEEDED
Status: COMPLETED | OUTPATIENT
Start: 2023-12-14 | End: 2023-12-14

## 2023-12-14 RX ORDER — DOCUSATE SODIUM 100 MG/1
100 CAPSULE, LIQUID FILLED ORAL 2 TIMES DAILY PRN
Qty: 14 CAPSULE | Refills: 0 | Status: SHIPPED | OUTPATIENT
Start: 2023-12-14 | End: 2023-12-21

## 2023-12-14 RX ORDER — CELECOXIB 200 MG/1
400 CAPSULE ORAL ONCE
Status: COMPLETED | OUTPATIENT
Start: 2023-12-14 | End: 2023-12-14

## 2023-12-14 RX ORDER — SULFAMETHOXAZOLE AND TRIMETHOPRIM 800; 160 MG/1; MG/1
1 TABLET ORAL 2 TIMES DAILY
Qty: 8 TABLET | Refills: 0 | Status: SHIPPED | OUTPATIENT
Start: 2023-12-14 | End: 2023-12-18

## 2023-12-14 RX ORDER — FENTANYL CITRATE 50 UG/ML
INJECTION, SOLUTION INTRAMUSCULAR; INTRAVENOUS AS NEEDED
Status: DISCONTINUED | OUTPATIENT
Start: 2023-12-14 | End: 2023-12-14

## 2023-12-14 RX ORDER — LIDOCAINE HYDROCHLORIDE 10 MG/ML
0.1 INJECTION, SOLUTION EPIDURAL; INFILTRATION; INTRACAUDAL; PERINEURAL ONCE
Status: DISCONTINUED | OUTPATIENT
Start: 2023-12-14 | End: 2023-12-14 | Stop reason: HOSPADM

## 2023-12-14 RX ORDER — ONDANSETRON HYDROCHLORIDE 2 MG/ML
INJECTION, SOLUTION INTRAVENOUS AS NEEDED
Status: DISCONTINUED | OUTPATIENT
Start: 2023-12-14 | End: 2023-12-14

## 2023-12-14 RX ORDER — BUPIVACAINE HYDROCHLORIDE 2.5 MG/ML
INJECTION, SOLUTION EPIDURAL; INFILTRATION; INTRACAUDAL AS NEEDED
Status: DISCONTINUED | OUTPATIENT
Start: 2023-12-14 | End: 2023-12-14 | Stop reason: HOSPADM

## 2023-12-14 RX ADMIN — ACETAMINOPHEN 975 MG: 325 TABLET ORAL at 08:13

## 2023-12-14 RX ADMIN — LIDOCAINE HYDROCHLORIDE 60 MG: 20 INJECTION, SOLUTION INFILTRATION; PERINEURAL at 10:40

## 2023-12-14 RX ADMIN — DEXAMETHASONE SODIUM PHOSPHATE 4 MG: 4 INJECTION, SOLUTION INTRA-ARTICULAR; INTRALESIONAL; INTRAMUSCULAR; INTRAVENOUS; SOFT TISSUE at 10:44

## 2023-12-14 RX ADMIN — FENTANYL CITRATE 25 MCG: 50 INJECTION, SOLUTION INTRAMUSCULAR; INTRAVENOUS at 12:04

## 2023-12-14 RX ADMIN — HYDROMORPHONE HYDROCHLORIDE 0.5 MG: 1 INJECTION, SOLUTION INTRAMUSCULAR; INTRAVENOUS; SUBCUTANEOUS at 13:32

## 2023-12-14 RX ADMIN — SODIUM CHLORIDE, SODIUM LACTATE, POTASSIUM CHLORIDE, AND CALCIUM CHLORIDE: 600; 310; 30; 20 INJECTION, SOLUTION INTRAVENOUS at 10:31

## 2023-12-14 RX ADMIN — MIDAZOLAM HYDROCHLORIDE 2 MG: 1 INJECTION, SOLUTION INTRAMUSCULAR; INTRAVENOUS at 10:31

## 2023-12-14 RX ADMIN — Medication 100 MCG: at 10:44

## 2023-12-14 RX ADMIN — Medication 100 MCG: at 10:49

## 2023-12-14 RX ADMIN — ONDANSETRON 4 MG: 2 INJECTION, SOLUTION INTRAMUSCULAR; INTRAVENOUS at 10:44

## 2023-12-14 RX ADMIN — Medication 100 MCG: at 11:07

## 2023-12-14 RX ADMIN — CELECOXIB 400 MG: 200 CAPSULE ORAL at 08:14

## 2023-12-14 RX ADMIN — SODIUM CHLORIDE, SODIUM LACTATE, POTASSIUM CHLORIDE, AND CALCIUM CHLORIDE 100 ML/HR: .6; .31; .03; .02 INJECTION, SOLUTION INTRAVENOUS at 12:34

## 2023-12-14 RX ADMIN — ONDANSETRON 4 MG: 2 INJECTION INTRAMUSCULAR; INTRAVENOUS at 16:47

## 2023-12-14 RX ADMIN — FENTANYL CITRATE 25 MCG: 50 INJECTION, SOLUTION INTRAMUSCULAR; INTRAVENOUS at 11:35

## 2023-12-14 RX ADMIN — ACETAMINOPHEN 975 MG: 325 TABLET ORAL at 16:53

## 2023-12-14 RX ADMIN — HYDROMORPHONE HYDROCHLORIDE 0.5 MG: 1 INJECTION, SOLUTION INTRAMUSCULAR; INTRAVENOUS; SUBCUTANEOUS at 13:15

## 2023-12-14 RX ADMIN — PROPOFOL 200 MG: 10 INJECTION, EMULSION INTRAVENOUS at 10:36

## 2023-12-14 RX ADMIN — GENTAMICIN SULFATE 430 MG: 40 INJECTION, SOLUTION INTRAMUSCULAR; INTRAVENOUS at 08:13

## 2023-12-14 RX ADMIN — GABAPENTIN 600 MG: 300 CAPSULE ORAL at 08:14

## 2023-12-14 RX ADMIN — FENTANYL CITRATE 50 MCG: 50 INJECTION, SOLUTION INTRAMUSCULAR; INTRAVENOUS at 10:40

## 2023-12-14 RX ADMIN — Medication 6 L/MIN: at 12:34

## 2023-12-14 RX ADMIN — VANCOMYCIN HYDROCHLORIDE 1500 MG: 10 INJECTION, POWDER, LYOPHILIZED, FOR SOLUTION INTRAVENOUS at 08:13

## 2023-12-14 RX ADMIN — HYDROMORPHONE HYDROCHLORIDE 0.5 MG: 1 INJECTION, SOLUTION INTRAMUSCULAR; INTRAVENOUS; SUBCUTANEOUS at 12:46

## 2023-12-14 SDOH — HEALTH STABILITY: MENTAL HEALTH: CURRENT SMOKER: 0

## 2023-12-14 ASSESSMENT — PAIN - FUNCTIONAL ASSESSMENT
PAIN_FUNCTIONAL_ASSESSMENT: 0-10

## 2023-12-14 ASSESSMENT — PAIN SCALES - GENERAL
PAINLEVEL_OUTOF10: 5 - MODERATE PAIN
PAINLEVEL_OUTOF10: 5 - MODERATE PAIN
PAINLEVEL_OUTOF10: 7
PAINLEVEL_OUTOF10: 5 - MODERATE PAIN
PAINLEVEL_OUTOF10: 7
PAINLEVEL_OUTOF10: 7
PAIN_LEVEL: 0
PAINLEVEL_OUTOF10: 7
PAINLEVEL_OUTOF10: 5 - MODERATE PAIN
PAINLEVEL_OUTOF10: 7
PAINLEVEL_OUTOF10: 7
PAINLEVEL_OUTOF10: 5 - MODERATE PAIN

## 2023-12-14 ASSESSMENT — PAIN DESCRIPTION - LOCATION
LOCATION: PENIS
LOCATION: PENIS

## 2023-12-14 NOTE — ANESTHESIA POSTPROCEDURE EVALUATION
Patient: Behzad Gilmore    Procedure Summary       Date: 12/14/23 Room / Location: [facility] A OR 01 / Virtual [facility] A OR    Anesthesia Start: 1031 Anesthesia Stop: 1239    Procedure: Penile Prosthesis Insertion (Penis) Diagnosis:       Erectile dysfunction, unspecified erectile dysfunction type      Peyronie's disease      (Erectile dysfunction, unspecified erectile dysfunction type [N52.9])      (Peyronie's disease [N48.6])    Surgeons: Kristen Gross MD Responsible Provider: Justina Ramírez MD    Anesthesia Type: general ASA Status: 2            Anesthesia Type: general    Vitals Value Taken Time   /69 12/14/23 1345   Temp 36.2 °C (97.2 °F) 12/14/23 1330   Pulse 69 12/14/23 1345   Resp 15 12/14/23 1345   SpO2 96 % 12/14/23 1345       Anesthesia Post Evaluation    Patient location during evaluation: PACU  Patient participation: complete - patient participated  Level of consciousness: awake  Pain score: 0  Pain management: adequate  Airway patency: patent  Cardiovascular status: acceptable  Respiratory status: acceptable  Hydration status: acceptable  Postoperative Nausea and Vomiting: none        No notable events documented.

## 2023-12-14 NOTE — SIGNIFICANT EVENT
Pt requesting to be cathed at this time after multiple times being unable to void, MD notified bladder scan reading 220

## 2023-12-14 NOTE — SIGNIFICANT EVENT
MD messaged for order to place a appiah to go home with patient, 16fr coude placed at this time 300cc of urine out at this time, pt instructed on home going care for appiah, verbalized understanding, per MD appiah to be removed 12/18 at patient follow up visit, pt made aware

## 2023-12-14 NOTE — ANESTHESIA PREPROCEDURE EVALUATION
Patient: Behzad Gilmore    Procedure Information       Date/Time: 12/14/23 0930    Procedure: Penile Prosthesis Insertion (Penis)    Location: [facility] A OR 01 / Virtual [facility] A OR    Surgeons: Kristen Gross MD            Relevant Problems   Cardiovascular   (+) Hypertriglyceridemia   (+) Mixed hyperlipidemia      /Renal   (+) Fatty liver      Neuro/Psych   (+) Anxiety      GI/Hepatic   (+) Fatty liver       Clinical information reviewed:   Tobacco  Allergies  Meds   Med Hx  Surg Hx   Fam Hx  Soc Hx        NPO Detail:  NPO/Void Status  Date of Last Liquid: 12/14/23  Time of Last Liquid: 0430  Date of Last Solid: 12/13/23  Time of Last Solid: 1800  Last Intake Type: Clear fluids  Time of Last Void: 0430         Physical Exam    Airway  Mallampati: II  TM distance: >3 FB  Neck ROM: full     Cardiovascular - normal exam     Dental    Pulmonary - normal exam     Abdominal - normal exam             Anesthesia Plan    ASA 2     general     The patient is not a current smoker.    intravenous induction   Anesthetic plan and risks discussed with patient.    Plan discussed with CAA and CRNA.

## 2023-12-14 NOTE — ANESTHESIA PROCEDURE NOTES
Airway  Date/Time: 12/14/2023 10:37 AM  Urgency: elective    Airway not difficult    Staffing  Performed: CRNA   Authorized by: Justina Ramírez MD    Performed by: SHADI Bennett-CRNA  Patient location during procedure: OR    Indications and Patient Condition  Indications for airway management: anesthesia  Spontaneous Ventilation: absent  Sedation level: deep  Preoxygenated: yes  Patient position: sniffing  Mask difficulty assessment: 0 - not attempted  Planned trial extubation    Final Airway Details  Final airway type: supraglottic airway      Successful airway: classic  Size 4     Number of attempts at approach: 1

## 2023-12-14 NOTE — DISCHARGE INSTRUCTIONS
DEPARTMENT OF Urology  DISCHARGE INSTRUCTIONS PENILE PROSTHESIS  Outpatient Surgery    C O N F I D E N T I A L   I N F O R M A T I O N    Behzad Gilmore    Call Urology Department [phone] during regular daytime business hours (8:00 am - 5:00 pm) and after 5:00 pm ask for the Urology resident with any questions or concerns.      If it is a life-threatening situation, proceed to the nearest emergency department.        Follow-up appointment:  12/18 follow up for drain removal     Thank you for the opportunity to care for you today.  Your health and healing are very important to us.  We hope we made you feel as comfortable as possible and are committed to your recovery and continued well-being.      The following is a brief overview of your penile prosthesis procedure. Some of the information contained on this summary may be confidential.  This information should be kept in your records and should be shared with your regular doctor.    Physicians:   Dr. Gross    Procedure performed: insertion of penile prosthesis    What to Expect During your Recovery and Home Care  Anesthesia Side Effects   You received general anesthesia today.  You may feel sleepy, tired, or have a sore throat.   You may also feel drowsiness, dizziness, or inability to think clearly.  For your safety, do not drive, drink alcoholic beverages, take any unprescribed medication or make any important decisions for 24 hours.  A responsible adult should be with you for 24 hours.       Activity and Recovery    Go home and rest. No strenuous activity and no heavy lifting over 10 lbs.     Pain Control  Unfortunately, you may experience pain after your procedure.  Adequate management can include alternative measures to help ease your pain and can include ice packs, scrotal support, and over the counter Tylenol. You have also been prescribed Gabapentin and Meloxicam for pain control. Oxycodone may also be prescribe for breakthrough pain. Do not  take more than 4,000 mg of Tylenol in a 24-hour period.     Oxycodone is a narcotic medication, which can impair judgment, slow reaction times, and cause constipation. Take Miralax, Colace, or other stool softeners for constipation. Do not drive or operate machinery while taking narcotic medications.    Antibiotics:  You have been prescribed Bactrim. Please take this twice a day for 7 days.    Nausea/Vomiting   Clear liquids are best tolerated at first. Start slow, advance your diet as tolerated to normal foods. Avoid spicy, greasy, heavy foods at first. Also, you may feel nauseous or like you need to vomit if you take any type of medication on an empty stomach.  Call your physician if you are unable to eat or drink and have persistent vomiting.          Signs of Bleeding   Minor bleeding or drainage may occur from the surgical site; however, excessive or consistent bleeding should be reported to your surgeon. Excessive bleeding is defined as blood that is dripping from wound, soaking you bandages, and is ketchup colored, thick with possible blood clots.  Consistent is defined as bleeding that does not stop.    Treatment/wound care:   Keep area(s) clean and dry. You can wear gauze dressing on top of the incisions, so the scrotal support does not irritate the incisions. It is incredibly important that you keep the scrotum well supported for the first two weeks after the surgery. Continue to wear the scrotal support/jock straps/tight fitting underwear.  If you have a drain in place, we will schedule an appointment for the drain to be removed.  The device is purposefully left partially inflated (approximately 30 to 40 percent). This will be deflated at the two week visit.  Pull the scrotal pump straight down towards the ground in the shower when the scrotal skin is supple. You should also do this every time you go to the bathroom This will allow the pump to heal in a nice dependent position.  It is okay to shower 48  hours after time of surgery.  Do not submerge incisions in water (tub bath, swimming) until incisions have completely healed.  Do not scrub wound(s), pat dry.  Clean with mild soap. Do not use oils or lotions on incisions.    Please visually inspect your wound(s) at least once daily.  If the wound(s) are in a difficult to see location, please use a mirror or have someone else assist with visual inspection.    Signs of Infection  Signs of infection can include fever, excessive swelling, heat, drainage, redness, or severe pain.  If you see any of these occur, please contact [phone]. Any fever higher than 100.4, especially if associated with an ill feeling, abdominal pain, chills, or nausea should be reported to your surgeon.        Additional Instructions:   No sex until discussed further at your follow up appointment. Your device has been left partially inflated, do not deflate, or inflate further. You should wear tight fitting underwear for the next six weeks. At your six week follow up you will be taught how to use your device.

## 2023-12-14 NOTE — OP NOTE
Penile Prosthesis Insertion Operative Note     Date: 2023  OR Location: [facility] A OR    Name: Behzad Gilmore, : 1960, Age: 63 y.o., MRN: [id number], Sex: male    Diagnosis  Pre-op Diagnosis     * Erectile dysfunction, unspecified erectile dysfunction type [N52.9]     * Peyronie's disease [N48.6] Post-op Diagnosis     * Erectile dysfunction, unspecified erectile dysfunction type [N52.9]     * Peyronie's disease [N48.6]     Procedures  Penile Prosthesis Insertion  53502 - NM INSJ MULTI-COMPONENT INFLATABLE PENILE PROSTH    NM NJX C/P/A CAVERNOSA W/PHARMACOLOGIC AGT [32755]  NM PLASTIC RPR PENIS CORRECT ANGULATION [73452] Plastic operation on penis to correct angulation (Penile Modeling)  - CPT 46141 with modifier 59 / ACOSTA - This is a disctinct procedure not typically performed for penile prosthesis placement and carries and additional risk of penile and urethral injury.    Disruption of Peyronie's Plaque with Rossello Dilators and Scratch Technique - CPT 04233 with modifier 59 / ACOSTA - This is a disctinct procedure not typically performed for penile prosthesis placement and carries and additional risk of penile and urethral injury  Surgeons      * Kristen Gross - Primary    Resident/Fellow/Other Assistant:  Surgeon(s) and Role:     * Debbie Madera DO - Resident - Assisting    Procedure Summary  Anesthesia: General  ASA: II  Anesthesia Staff: Anesthesiologist: Justina Ramírez MD  CRNA: SHADI Bennett-CRNA  Estimated Blood Loss: 75 mL  Intra-op Medications: * No intraprocedure medications in log *           Anesthesia Record               Intraprocedure I/O Totals          Intake    LR 1000.00 mL    Total Intake 1000 mL       Output    Urine 200 mL    Est. Blood Loss 10 mL    Total Output 210 mL       Net    Net Volume 790 mL          Specimen: No specimens collected     Staff:   Circulator: Stacey Stewart RN  Relief Circulator: Chitra Daily RN  Relief Scrub: Willis Lyman RN  Scrub  Person: Lois Wolff         Drains and/or Catheters:   Closed/Suction Drain Groin Bulb  (Active)       Urethral Catheter Coude 18 Fr. (Active)       Tourniquet Times:       Findings: Placement of Coloplast Titan OTR 3-piece inflatable penile prosthesis, injection of corpora cavernosa with pharmacologic agent     Cylinders:  20 cm     Rear tips: 1.5 cm bilaterally     Reservoir: 125 mL     Site:  Left space of retzius    Indications: The patient is a 63 y.o. year old male with a history of Peyronie's disease and severe vasculogenic erectile dysfunction refractory to conservative management with PDE 5 inhibitors, VCD, and intracavernosal therapy. Patient decided to undergo definitive surgical management with inflatable penile prosthesis. The risks (infection, erosion, decrease penile size, damage to other organs, device mechanical failure and need for replacement, etc) and benefits were explained and all questions and concerns were addressed.    The patient was seen in the preoperative area. The risks, benefits, complications, treatment options, non-operative alternatives, expected recovery and outcomes were discussed with the patient. The possibilities of reaction to medication, pulmonary aspiration, injury to surrounding structures, bleeding, recurrent infection, the need for additional procedures, failure to diagnose a condition, and creating a complication requiring transfusion or operation were discussed with the patient. The patient concurred with the proposed plan, giving informed consent.  The site of surgery was properly noted/marked if necessary per policy. The patient has been actively warmed in preoperative area. Preoperative antibiotics have been ordered and given within 2 hours of incision. Venous thrombosis prophylaxis have been ordered including bilateral sequential compression devices    Procedure Details: The patient was correctly identified in the preoperative holding area and informed consent  was obtained and documented.  He was examined in the preoperative area to ensure he had no scrotal or perineal rashes or skin infections. He received preoperative antibiotics in the form of vancomycin and gentamicin IV per pharmacy protocol. He was then brought to the operating room and placed on the table in supine position.  After a universal timeout, venodyne boots were placed and after adequate induction, general anesthesia was given. The external genitalia was shaved with clippers making sure not damage the scrotal or penile skin.  A penile block was performed with a total of 10 mL of 0.5% Marcaine  The patient was then prepped with ChloraPrep after a Hibiclens wash and draped in standard sterile fashion.    A 18 [place] coude Curtis catheter was placed sterilely on the field and the balloon inflated with 10 cc of sterile water. A Royal retractor was brought onto the field and the urethral hook was placed at the 12 o'clock position of the meatus and penis was placed on stretch. A 3 cm incision was made sharply with the scalpel approximately one fingerbreadth below the penoscrotal junction in a transverse fashion. All surgeons then changed their top layer of gloves. [place] were then used with the Royal retractor to properly expose the field.      The incision was then brought down to the dartos fascia using Bovie electrocautery layer by layer until the corpora cavernosa were exposed.  2-0 Monocryl holding sutures were placed and corporotomies were made using Bovie electrocautery.  An artificial erection was then induced using injection of 30 cc of 0.25% Marcaine and 20 cc of injectable saline. Dorsal and left curvature noted about 30 degrees. The corpora were easily dilated with the #9 and #12 Brown dilators. Then, disruption of Peyronie's plaque with Rossello Dilators via the scratch technique was performed.  Using the Dilamezinsert the patient's corpora were measured to be 11 cm in the proximal direction and  10.5 cm in the distal direction and we decided to place 20 cm cylinders with 1.5 rear tips.      We then turned our attention to the space of Retzius.  This was bluntly dissected on the left hand side by the surgeon.  The space was created through which we placed the reservoir.  The reservoir was then filled with 85 mL of sterile saline. Copious amounts of antibiotic solution were used during this dissection and placement.    Once this was complete, the penile prosthesis was prepared off the field and brought onto the field in a Dora bag filled with antibiotic solution. The penile prosthesis cylinders were placed first proximally and then distally using a Darryl needle on a Georges passer.  The holding sutures were then tied down to close the corporotomies. Copious amounts of antibiotic solution were used for irrigation during this part of the procedure.      A Mandy clamp and nasal speculum were used to develop a dartos pouch in which the penile prosthesis pump was placed. The pump was placed in the most dependent region of the scrotum and held down.  The tubing was then connected using connectors from the penile prosthesis kit.  Intervening tissue was closed using 2-0 Vicryl suture.  The cylinders were then tested and inflated. Given the presence of persistent curvature penile modeling was then performed resulting in reduction of the curvature to about 10 degrees.     A SKYE drain was placed on the right hand side of the groin through a stab incision and placed dependently over the pump.  The dartos was then closed in a running fashion using a 2-0 Vicryl suture after copious amounts of antibiotic irrigation.  Subcutaneous tissue was then approximated with 2-0 Vicryl in a simple running fashion.The skin was then reapproximated by simple interrupted vertical mattress sutures using 3-0 Monocryl . The wound was then cleaned and dried and Dermabond was applied. The appiah catheter was then removed. Scrotal support and  scrotal fluffs were placed on the patient.  The patient was then awoken from general anesthesia and seemed to tolerate the procedure quite well. He was transferred to the stretcher and brought back to the PACU in stable condition.    Complications:  None; patient tolerated the procedure well.    Disposition: PACU - hemodynamically stable.  Condition: stable     Additional Details: Follow-up 12/18 for drain removal    Attending Attestation:     Kristen Gross  Phone Number: [phone]

## 2023-12-14 NOTE — PERIOPERATIVE NURSING NOTE
1330- Assumed care of patient at this time. Report from PATRICE Carcamo     1332- Medicated patient at this time per orders for pain rating of 7/10. Verified allergies prior to admin     1402- criteria met for patient to discharge from Phase I at this time     1403- PHASE II

## 2023-12-14 NOTE — POST-PROCEDURE NOTE
1415 nurse helps pt getting dressed , family at bedside this time.  1425 Pt ambulated to toilet and can't void this time  1430 Discharge instruction reviewed with pt and family by nurse  1440 Pt ambulated to toilet.

## 2023-12-18 ENCOUNTER — OFFICE VISIT (OUTPATIENT)
Dept: UROLOGY | Facility: CLINIC | Age: 63
End: 2023-12-18
Payer: COMMERCIAL

## 2023-12-18 VITALS — BODY MASS INDEX: 24.08 KG/M2 | WEIGHT: 172 LBS | HEIGHT: 71 IN

## 2023-12-18 DIAGNOSIS — N52.9 ERECTILE DYSFUNCTION, UNSPECIFIED ERECTILE DYSFUNCTION TYPE: ICD-10-CM

## 2023-12-18 DIAGNOSIS — N48.6 PEYRONIE'S DISEASE: ICD-10-CM

## 2023-12-18 PROCEDURE — 1036F TOBACCO NON-USER: CPT | Performed by: NURSE PRACTITIONER

## 2023-12-18 PROCEDURE — 99024 POSTOP FOLLOW-UP VISIT: CPT | Performed by: NURSE PRACTITIONER

## 2023-12-18 ASSESSMENT — COLUMBIA-SUICIDE SEVERITY RATING SCALE - C-SSRS
1. IN THE PAST MONTH, HAVE YOU WISHED YOU WERE DEAD OR WISHED YOU COULD GO TO SLEEP AND NOT WAKE UP?: NO
2. HAVE YOU ACTUALLY HAD ANY THOUGHTS OF KILLING YOURSELF?: NO
6. HAVE YOU EVER DONE ANYTHING, STARTED TO DO ANYTHING, OR PREPARED TO DO ANYTHING TO END YOUR LIFE?: NO

## 2023-12-18 ASSESSMENT — ENCOUNTER SYMPTOMS
OCCASIONAL FEELINGS OF UNSTEADINESS: 0
DEPRESSION: 0
LOSS OF SENSATION IN FEET: 0

## 2023-12-18 ASSESSMENT — PATIENT HEALTH QUESTIONNAIRE - PHQ9
2. FEELING DOWN, DEPRESSED OR HOPELESS: NOT AT ALL
SUM OF ALL RESPONSES TO PHQ9 QUESTIONS 1 AND 2: 0
1. LITTLE INTEREST OR PLEASURE IN DOING THINGS: NOT AT ALL

## 2023-12-18 ASSESSMENT — PAIN SCALES - GENERAL: PAINLEVEL: 0-NO PAIN

## 2024-01-03 ENCOUNTER — OFFICE VISIT (OUTPATIENT)
Dept: UROLOGY | Facility: CLINIC | Age: 64
End: 2024-01-03
Payer: COMMERCIAL

## 2024-01-03 DIAGNOSIS — N52.9 ERECTILE DYSFUNCTION, UNSPECIFIED ERECTILE DYSFUNCTION TYPE: Primary | ICD-10-CM

## 2024-01-03 PROBLEM — Z96.0 S/P INSERTION OF PENILE IMPLANT: Status: ACTIVE | Noted: 2024-01-03

## 2024-01-03 LAB
POC APPEARANCE, URINE: CLEAR
POC BILIRUBIN, URINE: NEGATIVE
POC BLOOD, URINE: NEGATIVE
POC COLOR, URINE: YELLOW
POC GLUCOSE, URINE: NEGATIVE MG/DL
POC KETONES, URINE: ABNORMAL MG/DL
POC LEUKOCYTES, URINE: NEGATIVE
POC NITRITE,URINE: NEGATIVE
POC PH, URINE: 6 PH
POC PROTEIN, URINE: NEGATIVE MG/DL
POC SPECIFIC GRAVITY, URINE: >=1.03
POC UROBILINOGEN, URINE: 0.2 EU/DL

## 2024-01-03 PROCEDURE — 99024 POSTOP FOLLOW-UP VISIT: CPT | Performed by: UROLOGY

## 2024-01-03 PROCEDURE — 1036F TOBACCO NON-USER: CPT | Performed by: UROLOGY

## 2024-01-03 PROCEDURE — 81002 URINALYSIS NONAUTO W/O SCOPE: CPT | Performed by: UROLOGY

## 2024-01-03 NOTE — PROGRESS NOTES
HPI  64 yo M referred by Dr. Patel for erectile dysfunction. S/P vasectomy in 2004. Sp TURP 10/2020.     Last visit 10/31/23:      -continue with coloplast IPP      -penoscrotal approach      -IPP modeling with additional indicated procedures      -vancomycin / gentamicin       -traditional reservoir placement      -will not leave appiah cath in overnight as he has had prior TURP     Todays Visit:  #Erectile Dysfunction   -s/p Coloplast Titan OTR 3-piece inflatable penile prosthesis, left space of retzius, 20cm cylinders, 1.5cm bilateral rear tips, 125ml reservoir 12/14/23  -doing well  -denies blood, pus drainage at incision site       -Hernia Repair? yes 2004, umbilical repair with mesh  -Pain or curvature in penis when erect: curves to left     Patient states he had colon cancer 2017, after catheter 2 different times for 30 days and has had symptoms ever since     Current Medications:  Current Outpatient Medications   Medication Sig Dispense Refill    atorvastatin (Lipitor) 10 mg tablet Take 1 tablet (10 mg) by mouth once daily.      chlorhexidine (Peridex) 0.12 % solution 15 ml swish and spit for 30 seconds night prior to surgery and morning of surgery 473 mL 0    coenzyme Q10 150 mg capsule Take 1 capsule (150 mg) by mouth once daily. With a meal      omega-3 (Super Omega-3) 1,000 mg capsule capsule 1 capsule (1,000 mg) once daily.      tadalafil 20 mg tablet start with ONE-HALF tablet BY MOUTH as needed 2 (TWO) hours prior to activity. May increase to 1 (ONE) tab as needed 2 (TWO) hours prior to activity.       No current facility-administered medications for this visit.        PMH:  Past Medical History:   Diagnosis Date    Anxiety     Colorectal cancer (CMS/HCC) 2016    surgery and chemo    Erectile dysfunction, unspecified erectile dysfunction type     Hyperlipidemia     Peyronie's disease     Vision loss     glasses       PSH:  Past Surgical History:   Procedure Laterality Date    APPENDECTOMY  2016     COLON SURGERY  2016    [facility] ascending colon resection    HERNIA REPAIR  2004    umbilical with mesh    OTHER SURGICAL HISTORY Right 1980    knee arthroscopy    PROSTATE SURGERY  2020    TURP    TONSILLECTOMY      4 yo    VASECTOMY  2002       FMH:  Family History   Problem Relation Name Age of Onset    Hyperlipidemia Mother      Other (aortic valve replacement) Father      Other (htn) Father      Dementia Father      Alzheimer's disease Sister         Allergies:  Allergies   Allergen Reactions    Penicillin Hives and Itching    Penicillins Hives and Rash     Physical Exam  Implant: tips properly placed, inflates and deflates properly   Pump in place, nonfixed, able to inflate and deflate   No signs of infection erosion or extrusion   Appropriate healing of incision     Assesment/Plan  #Severe ED refractory to ICI and pills, and significant peyronies disease.   -s/p Coloplast Titan OTR 3-piece inflatable penile prosthesis, left space of retzius, 20cm cylinders, 1.5cm bilateral rear tips, 125ml reservoir 12/14/23  -healing well  -inflated and deflated today, reviewed pumping instructions    -advised patient to inflate and deflate everyday    Follow up in 3 weeks     Scribe Attestation  By signing my name below, I, Janeth Leary-Jitendra , Scribe   attest that this documentation has been prepared under the direction and in the presence of Kristen Gross MD.

## 2024-01-23 NOTE — PROGRESS NOTES
HPI  64 yo M referred by Dr. Patel for erectile dysfunction. S/P vasectomy in 2004. Sp TURP 10/2020.     Last visit 1/3/23:      -healing well      -inflated and deflated today, reviewed pumping instructions        -advised patient to inflate and deflate everyday     Todays Visit:  #Erectile Dysfunction   -s/p Coloplast Titan OTR 3-piece inflatable penile prosthesis, left space of retzius, 20cm cylinders, 1.5cm bilateral rear tips, 125ml reservoir 12/14/23  - He has been doing well since surgery  - Still doing exercise on implant daily for curvature, he soaks in tub to help  - He is a  and sometimes depending on sitting position the IPP auto-inflates.  - No fever, chills, nausea, vomiting, hematuria    -Hernia Repair? yes 2004, umbilical repair with mesh  -Pain or curvature in penis when erect: curves to left, has improved since placement     Patient states he had colon cancer 2017, after catheter 2 different times for 30 days and has had symptoms ever since     Current Medications:  Current Outpatient Medications   Medication Sig Dispense Refill    atorvastatin (Lipitor) 10 mg tablet Take 1 tablet (10 mg) by mouth once daily.      chlorhexidine (Peridex) 0.12 % solution 15 ml swish and spit for 30 seconds night prior to surgery and morning of surgery 473 mL 0    coenzyme Q10 150 mg capsule Take 1 capsule (150 mg) by mouth once daily. With a meal      omega-3 (Super Omega-3) 1,000 mg capsule capsule 1 capsule (1,000 mg) once daily.      tadalafil 20 mg tablet start with ONE-HALF tablet BY MOUTH as needed 2 (TWO) hours prior to activity. May increase to 1 (ONE) tab as needed 2 (TWO) hours prior to activity.       No current facility-administered medications for this visit.        PMH:  Past Medical History:   Diagnosis Date    Anxiety     Colorectal cancer (CMS/HCC) 2016    surgery and chemo    Erectile dysfunction, unspecified erectile dysfunction type     Hyperlipidemia     Peyronie's disease     Vision  loss     glasses       PSH:  Past Surgical History:   Procedure Laterality Date    APPENDECTOMY  2016    COLON SURGERY  2016    [facility] ascending colon resection    HERNIA REPAIR  2004    umbilical with mesh    OTHER SURGICAL HISTORY Right 1980    knee arthroscopy    PROSTATE SURGERY  2020    TURP    TONSILLECTOMY      4 yo    VASECTOMY  2002       FMH:  Family History   Problem Relation Name Age of Onset    Hyperlipidemia Mother      Other (aortic valve replacement) Father      Other (htn) Father      Dementia Father      Alzheimer's disease Sister         Allergies:  Allergies   Allergen Reactions    Penicillin Hives and Itching    Penicillins Hives and Rash     Physical Exam  Implant: tips properly placed, inflates and deflates properly   Pump in place, nonfixed, able to inflate and deflate   No signs of infection erosion or extrusion   Appropriate healing of incision  Approximately 15 degree curvature residual left and upwards.    Assesment/Plan  #Severe ED refractory to ICI and pills, and significant peyronies disease.   -s/p Coloplast Titan OTR 3-piece inflatable penile prosthesis, left space of retzius, 20cm cylinders, 1.5cm bilateral rear tips, 125ml reservoir 12/14/23  -healing well  -inflated and deflated today, reviewed pumping instructions    -advised patient to inflate and deflate everyday  - no restrictions for sex    Follow up in 3 months     Scribe Attestation  By signing my name below, IJaneth Scribe   attest that this documentation has been prepared under the direction and in the presence of Kristen Gross MD.     Scribe Attestation  By signing my name below, Jessica ANDRADE Scribe attest that this documentation has been prepared under the direction and in the presence of Kristen Gross MD. All medical record entries made by the Scribe were at my direction or personally dictated by me.

## 2024-01-24 ENCOUNTER — OFFICE VISIT (OUTPATIENT)
Dept: UROLOGY | Facility: CLINIC | Age: 64
End: 2024-01-24
Payer: COMMERCIAL

## 2024-01-24 DIAGNOSIS — Z96.0 S/P INSERTION OF PENILE IMPLANT: ICD-10-CM

## 2024-01-24 DIAGNOSIS — N48.6 PEYRONIE'S DISEASE: ICD-10-CM

## 2024-01-24 DIAGNOSIS — N52.9 ERECTILE DYSFUNCTION, UNSPECIFIED ERECTILE DYSFUNCTION TYPE: Primary | ICD-10-CM

## 2024-01-24 PROCEDURE — 99024 POSTOP FOLLOW-UP VISIT: CPT | Performed by: UROLOGY

## 2024-01-24 PROCEDURE — 1036F TOBACCO NON-USER: CPT | Performed by: UROLOGY

## 2024-04-10 ENCOUNTER — TELEPHONE (OUTPATIENT)
Dept: PRIMARY CARE | Facility: CLINIC | Age: 64
End: 2024-04-10
Payer: COMMERCIAL

## 2024-04-10 DIAGNOSIS — E78.2 MIXED HYPERLIPIDEMIA: ICD-10-CM

## 2024-04-10 DIAGNOSIS — Z00.00 ANNUAL PHYSICAL EXAM: ICD-10-CM

## 2024-04-10 DIAGNOSIS — Z12.5 SCREENING FOR PROSTATE CANCER: ICD-10-CM

## 2024-04-13 ENCOUNTER — LAB (OUTPATIENT)
Dept: LAB | Facility: LAB | Age: 64
End: 2024-04-13
Payer: COMMERCIAL

## 2024-04-13 DIAGNOSIS — Z12.5 SCREENING FOR PROSTATE CANCER: ICD-10-CM

## 2024-04-13 DIAGNOSIS — Z00.00 ANNUAL PHYSICAL EXAM: ICD-10-CM

## 2024-04-13 DIAGNOSIS — E78.2 MIXED HYPERLIPIDEMIA: ICD-10-CM

## 2024-04-13 LAB
ALBUMIN SERPL BCP-MCNC: 4.5 G/DL (ref 3.4–5)
ALP SERPL-CCNC: 47 U/L (ref 33–136)
ALT SERPL W P-5'-P-CCNC: 17 U/L (ref 10–52)
ANION GAP SERPL CALC-SCNC: 14 MMOL/L (ref 10–20)
AST SERPL W P-5'-P-CCNC: 19 U/L (ref 9–39)
BASOPHILS # BLD AUTO: 0.07 X10*3/UL (ref 0–0.1)
BASOPHILS NFR BLD AUTO: 1.6 %
BILIRUB SERPL-MCNC: 0.6 MG/DL (ref 0–1.2)
BUN SERPL-MCNC: 13 MG/DL (ref 6–23)
CALCIUM SERPL-MCNC: 9.1 MG/DL (ref 8.6–10.3)
CHLORIDE SERPL-SCNC: 103 MMOL/L (ref 98–107)
CHOLEST SERPL-MCNC: 159 MG/DL (ref 0–199)
CHOLESTEROL/HDL RATIO: 3.1
CO2 SERPL-SCNC: 25 MMOL/L (ref 21–32)
CREAT SERPL-MCNC: 0.95 MG/DL (ref 0.5–1.3)
EGFRCR SERPLBLD CKD-EPI 2021: 89 ML/MIN/1.73M*2
EOSINOPHIL # BLD AUTO: 0.12 X10*3/UL (ref 0–0.7)
EOSINOPHIL NFR BLD AUTO: 2.7 %
ERYTHROCYTE [DISTWIDTH] IN BLOOD BY AUTOMATED COUNT: 12.9 % (ref 11.5–14.5)
GLUCOSE SERPL-MCNC: 95 MG/DL (ref 74–99)
HCT VFR BLD AUTO: 42.2 % (ref 41–52)
HDLC SERPL-MCNC: 52.1 MG/DL
HGB BLD-MCNC: 13.9 G/DL (ref 13.5–17.5)
IMM GRANULOCYTES # BLD AUTO: 0.01 X10*3/UL (ref 0–0.7)
IMM GRANULOCYTES NFR BLD AUTO: 0.2 % (ref 0–0.9)
LDLC SERPL CALC-MCNC: 85 MG/DL
LYMPHOCYTES # BLD AUTO: 1.11 X10*3/UL (ref 1.2–4.8)
LYMPHOCYTES NFR BLD AUTO: 25.2 %
MCH RBC QN AUTO: 30.6 PG (ref 26–34)
MCHC RBC AUTO-ENTMCNC: 32.9 G/DL (ref 32–36)
MCV RBC AUTO: 93 FL (ref 80–100)
MONOCYTES # BLD AUTO: 0.38 X10*3/UL (ref 0.1–1)
MONOCYTES NFR BLD AUTO: 8.6 %
NEUTROPHILS # BLD AUTO: 2.72 X10*3/UL (ref 1.2–7.7)
NEUTROPHILS NFR BLD AUTO: 61.7 %
NON HDL CHOLESTEROL: 107 MG/DL (ref 0–149)
NRBC BLD-RTO: 0 /100 WBCS (ref 0–0)
PLATELET # BLD AUTO: 191 X10*3/UL (ref 150–450)
POTASSIUM SERPL-SCNC: 4 MMOL/L (ref 3.5–5.3)
PROT SERPL-MCNC: 6.9 G/DL (ref 6.4–8.2)
PSA SERPL-MCNC: 0.51 NG/ML
RBC # BLD AUTO: 4.54 X10*6/UL (ref 4.5–5.9)
SODIUM SERPL-SCNC: 138 MMOL/L (ref 136–145)
TRIGL SERPL-MCNC: 108 MG/DL (ref 0–149)
TSH SERPL-ACNC: 1.49 MIU/L (ref 0.44–3.98)
VLDL: 22 MG/DL (ref 0–40)
WBC # BLD AUTO: 4.4 X10*3/UL (ref 4.4–11.3)

## 2024-04-13 PROCEDURE — 84443 ASSAY THYROID STIM HORMONE: CPT

## 2024-04-13 PROCEDURE — 36415 COLL VENOUS BLD VENIPUNCTURE: CPT

## 2024-04-13 PROCEDURE — 80061 LIPID PANEL: CPT

## 2024-04-13 PROCEDURE — 85025 COMPLETE CBC W/AUTO DIFF WBC: CPT

## 2024-04-13 PROCEDURE — 80053 COMPREHEN METABOLIC PANEL: CPT

## 2024-04-13 PROCEDURE — 84153 ASSAY OF PSA TOTAL: CPT

## 2024-04-24 DIAGNOSIS — E78.2 MIXED HYPERLIPIDEMIA: Primary | ICD-10-CM

## 2024-04-24 RX ORDER — ATORVASTATIN CALCIUM 10 MG/1
10 TABLET, FILM COATED ORAL DAILY
Qty: 90 TABLET | Refills: 3 | Status: SHIPPED | OUTPATIENT
Start: 2024-04-24 | End: 2024-05-06 | Stop reason: SDUPTHER

## 2024-04-30 ENCOUNTER — OFFICE VISIT (OUTPATIENT)
Dept: UROLOGY | Facility: CLINIC | Age: 64
End: 2024-04-30
Payer: COMMERCIAL

## 2024-04-30 VITALS — WEIGHT: 176 LBS | TEMPERATURE: 97.5 F | BODY MASS INDEX: 23.84 KG/M2 | HEIGHT: 72 IN

## 2024-04-30 DIAGNOSIS — N52.9 ERECTILE DYSFUNCTION, UNSPECIFIED ERECTILE DYSFUNCTION TYPE: ICD-10-CM

## 2024-04-30 DIAGNOSIS — Z09 ENCOUNTER FOR FOLLOW-UP: ICD-10-CM

## 2024-04-30 DIAGNOSIS — Z96.0 S/P INSERTION OF PENILE IMPLANT: Primary | ICD-10-CM

## 2024-04-30 DIAGNOSIS — N48.6 PEYRONIE'S DISEASE: ICD-10-CM

## 2024-04-30 PROCEDURE — 1036F TOBACCO NON-USER: CPT | Performed by: UROLOGY

## 2024-04-30 PROCEDURE — 99212 OFFICE O/P EST SF 10 MIN: CPT | Performed by: UROLOGY

## 2024-04-30 ASSESSMENT — PAIN SCALES - GENERAL: PAINLEVEL: 0-NO PAIN

## 2024-04-30 NOTE — PROGRESS NOTES
HPI  62 yo M referred by Dr. Patel for erectile dysfunction. S/P vasectomy in 2004. Sp TURP 10/2020.     Last visit 1/24/24:      -s/p Coloplast Titan OTR 3-piece inflatable penile prosthesis, left space of retzius, 20cm cylinders, 1.5cm bilateral rear tips, 125ml reservoir 12/14/23      -healing well      -inflated and deflated today, reviewed pumping instructions        -advised patient to inflate and deflate everyday      - no restrictions for sex     Todays Visit:  #Erectile Dysfunction   -s/p Coloplast Titan OTR 3-piece inflatable penile prosthesis, left space of retzius, 20cm cylinders, 1.5cm bilateral rear tips, 125ml reservoir 12/14/23  -doing well  -has not had sex, no issues with masturbation  -denies gross hematuria     -Hernia Repair? yes 2004, umbilical repair with mesh  -Pain or curvature in penis when erect: curves to left, has improved since placement  -had colon cancer 2017, after catheter 2 different times for 30 days and has had symptoms ever since     Current Medications:  Current Outpatient Medications   Medication Sig Dispense Refill    atorvastatin (Lipitor) 10 mg tablet TAKE 1 TABLET BY MOUTH EVERY DAY 90 tablet 3    coenzyme Q10 150 mg capsule Take 1 capsule (150 mg) by mouth once daily. With a meal      omega-3 (Super Omega-3) 1,000 mg capsule capsule 1 capsule (1,000 mg) once daily.      chlorhexidine (Peridex) 0.12 % solution 15 ml swish and spit for 30 seconds night prior to surgery and morning of surgery 473 mL 0    tadalafil 20 mg tablet start with ONE-HALF tablet BY MOUTH as needed 2 (TWO) hours prior to activity. May increase to 1 (ONE) tab as needed 2 (TWO) hours prior to activity.       No current facility-administered medications for this visit.        PMH:  Past Medical History:   Diagnosis Date    Anxiety     Colorectal cancer (Multi) 2016    surgery and chemo    Erectile dysfunction, unspecified erectile dysfunction type     Hyperlipidemia     Peyronie's disease     Vision loss      glasses       PSH:  Past Surgical History:   Procedure Laterality Date    APPENDECTOMY  2016    COLON SURGERY  2016    righr ascending colon resection    HERNIA REPAIR  2004    umbilical with mesh    OTHER SURGICAL HISTORY Right 1980    knee arthroscopy    PROSTATE SURGERY  2020    TURP    TONSILLECTOMY      2 yo    VASECTOMY  2002       FMH:  Family History   Problem Relation Name Age of Onset    Hyperlipidemia Mother      Other (aortic valve replacement) Father      Other (htn) Father      Dementia Father      Alzheimer's disease Sister         Allergies:  Allergies   Allergen Reactions    Penicillin Hives and Itching    Penicillins Hives and Rash     Physical Exam  Implant: tips properly placed, inflates and deflates properly   Pump in place, nonfixed, able to inflate and deflate   No signs of infection erosion or extrusion   Well healed incision    Assesment/Plan  #Severe ED refractory to ICI and pills, and significant peyronies disease.   -s/p Coloplast Titan OTR 3-piece inflatable penile prosthesis, left space of retzius, 20cm cylinders, 1.5cm bilateral rear tips, 125ml reservoir 12/14/23  -well healed   -inflated and deflated today, reviewed pumping instructions    -advised patient to inflate and deflate intermittently, do not go long periods of time without inflation  -no restrictions for sex    Follow up prn    Scribe Attestation  By signing my name below, I, Janeth Mondragon, Aj, attest that this documentation  has been prepared under the direction and in the presence of Kristen Gross MD.

## 2024-05-05 ASSESSMENT — PROMIS GLOBAL HEALTH SCALE
RATE_PHYSICAL_HEALTH: GOOD
RATE_GENERAL_HEALTH: GOOD
CARRYOUT_SOCIAL_ACTIVITIES: GOOD
CARRYOUT_PHYSICAL_ACTIVITIES: COMPLETELY
EMOTIONAL_PROBLEMS: RARELY
RATE_AVERAGE_PAIN: 0
RATE_AVERAGE_FATIGUE: MILD
RATE_SOCIAL_SATISFACTION: FAIR
RATE_QUALITY_OF_LIFE: GOOD
RATE_MENTAL_HEALTH: FAIR

## 2024-05-06 ENCOUNTER — OFFICE VISIT (OUTPATIENT)
Dept: PRIMARY CARE | Facility: CLINIC | Age: 64
End: 2024-05-06
Payer: COMMERCIAL

## 2024-05-06 VITALS
DIASTOLIC BLOOD PRESSURE: 68 MMHG | BODY MASS INDEX: 22.35 KG/M2 | OXYGEN SATURATION: 98 % | SYSTOLIC BLOOD PRESSURE: 114 MMHG | WEIGHT: 164.8 LBS | HEART RATE: 85 BPM

## 2024-05-06 DIAGNOSIS — Z12.5 SCREENING FOR PROSTATE CANCER: ICD-10-CM

## 2024-05-06 DIAGNOSIS — Z00.00 ROUTINE GENERAL MEDICAL EXAMINATION AT A HEALTH CARE FACILITY: Primary | ICD-10-CM

## 2024-05-06 DIAGNOSIS — Z85.038 HISTORY OF COLON CANCER: ICD-10-CM

## 2024-05-06 DIAGNOSIS — E78.2 MIXED HYPERLIPIDEMIA: ICD-10-CM

## 2024-05-06 DIAGNOSIS — F41.9 ANXIETY: ICD-10-CM

## 2024-05-06 PROCEDURE — 99396 PREV VISIT EST AGE 40-64: CPT | Performed by: FAMILY MEDICINE

## 2024-05-06 PROCEDURE — 1036F TOBACCO NON-USER: CPT | Performed by: FAMILY MEDICINE

## 2024-05-06 RX ORDER — ATORVASTATIN CALCIUM 10 MG/1
10 TABLET, FILM COATED ORAL DAILY
Qty: 90 TABLET | Refills: 3 | Status: SHIPPED | OUTPATIENT
Start: 2024-05-06

## 2024-05-06 ASSESSMENT — PATIENT HEALTH QUESTIONNAIRE - PHQ9
SUM OF ALL RESPONSES TO PHQ9 QUESTIONS 1 & 2: 0
1. LITTLE INTEREST OR PLEASURE IN DOING THINGS: NOT AT ALL
2. FEELING DOWN, DEPRESSED OR HOPELESS: NOT AT ALL

## 2024-05-06 ASSESSMENT — LIFESTYLE VARIABLES
AUDIT-C TOTAL SCORE: 2
HOW OFTEN DO YOU HAVE SIX OR MORE DRINKS ON ONE OCCASION: NEVER
SKIP TO QUESTIONS 9-10: 1
HOW OFTEN DO YOU HAVE A DRINK CONTAINING ALCOHOL: 2-4 TIMES A MONTH
HOW MANY STANDARD DRINKS CONTAINING ALCOHOL DO YOU HAVE ON A TYPICAL DAY: 1 OR 2

## 2024-05-06 ASSESSMENT — ENCOUNTER SYMPTOMS
OCCASIONAL FEELINGS OF UNSTEADINESS: 0
LOSS OF SENSATION IN FEET: 0
DEPRESSION: 0

## 2024-05-06 ASSESSMENT — PAIN SCALES - GENERAL: PAINLEVEL: 0-NO PAIN

## 2024-05-06 NOTE — PROGRESS NOTES
Subjective   Patient ID: Behzad Gilmore is a 63 y.o. male who presents for Annual Exam.    Pt is here for physical.      Seeing urology - penile implant.  Peyronies treated.  Doing well    Cancer History:  -Type: colon cancer - diagnosed 2017  -Stage:  Remission - colecotmy and chemotherapy.   -Oncologist:- [facility] - Dr. Ortiz (?) and Dr. Collado (?).   -F/U: Doing well.  Colonoscopy due this year.     Dyslipidemia  -F/U: Using atorvastatin - doing well.    -Compliance with treatment thus far has been good.             General:          c/o Cancer for follow-up. Pt is seeing gastoenterology - getting scopes every 3 years.  Last scope 1 year ago.  .         Types . colon cancer - diagnosed 2017.  Diagnosed due to weight loss and severe anemia.  Treated with colecotmy and chemotherapy.            Oncologist: [facility] - Dr. Ortiz (?) and Dr. Collado (?).         Stage .Initial diagnosis Stage II - remission since 2017.          Review of Systems    Objective   /68 (BP Location: Right arm, Patient Position: Sitting)   Pulse 85   Wt 74.8 kg (164 lb 12.8 oz)   SpO2 98%   BMI 22.35 kg/m²     Physical Exam  Vitals reviewed.   Constitutional:       General: He is not in acute distress.  Cardiovascular:      Rate and Rhythm: Normal rate and regular rhythm.   Pulmonary:      Effort: Pulmonary effort is normal.      Breath sounds: No wheezing or rhonchi.   Musculoskeletal:      Right lower leg: No edema.      Left lower leg: No edema.   Lymphadenopathy:      Cervical: No cervical adenopathy.   Neurological:      Mental Status: He is alert.         Assessment/Plan   Diagnoses and all orders for this visit:  Routine general medical examination at a health care facility  History of colon cancer  Mixed hyperlipidemia  Screening for prostate cancer    Patient Instructions   Here for physical.  Up to date on screening.  Blood work very good.  Continue healthy lifestyle.  Recheck in 1 year. Blood work 1 week prior.

## 2024-05-06 NOTE — PATIENT INSTRUCTIONS
Here for physical.  Up to date on screening.  Blood work very good.  Continue healthy lifestyle.  Recheck in 1 year. Blood work 1 week prior.

## 2025-04-04 LAB
ALBUMIN SERPL-MCNC: 4.2 G/DL (ref 3.6–5.1)
ALP SERPL-CCNC: 44 U/L (ref 35–144)
ALT SERPL-CCNC: 13 U/L (ref 9–46)
ANION GAP SERPL CALCULATED.4IONS-SCNC: 9 MMOL/L (CALC) (ref 7–17)
AST SERPL-CCNC: 19 U/L (ref 10–35)
BASOPHILS # BLD AUTO: 61 CELLS/UL (ref 0–200)
BASOPHILS NFR BLD AUTO: 1 %
BILIRUB SERPL-MCNC: 0.7 MG/DL (ref 0.2–1.2)
BUN SERPL-MCNC: 15 MG/DL (ref 7–25)
CALCIUM SERPL-MCNC: 8.8 MG/DL (ref 8.6–10.3)
CHLORIDE SERPL-SCNC: 104 MMOL/L (ref 98–110)
CHOLEST SERPL-MCNC: 128 MG/DL
CHOLEST/HDLC SERPL: 2.8 (CALC)
CO2 SERPL-SCNC: 26 MMOL/L (ref 20–32)
CREAT SERPL-MCNC: 0.8 MG/DL (ref 0.7–1.35)
EGFRCR SERPLBLD CKD-EPI 2021: 98 ML/MIN/1.73M2
EOSINOPHIL # BLD AUTO: 92 CELLS/UL (ref 15–500)
EOSINOPHIL NFR BLD AUTO: 1.5 %
ERYTHROCYTE [DISTWIDTH] IN BLOOD BY AUTOMATED COUNT: 12.4 % (ref 11–15)
GLUCOSE SERPL-MCNC: 87 MG/DL (ref 65–99)
HCT VFR BLD AUTO: 40.8 % (ref 38.5–50)
HDLC SERPL-MCNC: 46 MG/DL
HGB BLD-MCNC: 13.7 G/DL (ref 13.2–17.1)
LDLC SERPL CALC-MCNC: 67 MG/DL (CALC)
LYMPHOCYTES # BLD AUTO: 531 CELLS/UL (ref 850–3900)
LYMPHOCYTES NFR BLD AUTO: 8.7 %
MCH RBC QN AUTO: 31.7 PG (ref 27–33)
MCHC RBC AUTO-ENTMCNC: 33.6 G/DL (ref 32–36)
MCV RBC AUTO: 94.4 FL (ref 80–100)
MONOCYTES # BLD AUTO: 458 CELLS/UL (ref 200–950)
MONOCYTES NFR BLD AUTO: 7.5 %
NEUTROPHILS # BLD AUTO: 4959 CELLS/UL (ref 1500–7800)
NEUTROPHILS NFR BLD AUTO: 81.3 %
NONHDLC SERPL-MCNC: 82 MG/DL (CALC)
PLATELET # BLD AUTO: 173 THOUSAND/UL (ref 140–400)
PMV BLD REES-ECKER: 10.1 FL (ref 7.5–12.5)
POTASSIUM SERPL-SCNC: 4.3 MMOL/L (ref 3.5–5.3)
PROT SERPL-MCNC: 6.7 G/DL (ref 6.1–8.1)
PSA SERPL-MCNC: 0.37 NG/ML
RBC # BLD AUTO: 4.32 MILLION/UL (ref 4.2–5.8)
SODIUM SERPL-SCNC: 139 MMOL/L (ref 135–146)
TRIGL SERPL-MCNC: 71 MG/DL
TSH SERPL-ACNC: 1.42 MIU/L (ref 0.4–4.5)
WBC # BLD AUTO: 6.1 THOUSAND/UL (ref 3.8–10.8)

## 2025-05-01 ASSESSMENT — PROMIS GLOBAL HEALTH SCALE
RATE_AVERAGE_PAIN: 1
RATE_PHYSICAL_HEALTH: GOOD
RATE_QUALITY_OF_LIFE: VERY GOOD
CARRYOUT_SOCIAL_ACTIVITIES: FAIR
RATE_AVERAGE_FATIGUE: MILD
RATE_MENTAL_HEALTH: GOOD
EMOTIONAL_PROBLEMS: RARELY
CARRYOUT_PHYSICAL_ACTIVITIES: COMPLETELY
RATE_SOCIAL_SATISFACTION: FAIR
RATE_GENERAL_HEALTH: VERY GOOD

## 2025-05-07 ENCOUNTER — OFFICE VISIT (OUTPATIENT)
Dept: PRIMARY CARE | Facility: CLINIC | Age: 65
End: 2025-05-07
Payer: COMMERCIAL

## 2025-05-07 VITALS
TEMPERATURE: 99.7 F | RESPIRATION RATE: 18 BRPM | WEIGHT: 182.6 LBS | BODY MASS INDEX: 24.77 KG/M2 | HEART RATE: 86 BPM | OXYGEN SATURATION: 98 % | SYSTOLIC BLOOD PRESSURE: 110 MMHG | DIASTOLIC BLOOD PRESSURE: 72 MMHG

## 2025-05-07 DIAGNOSIS — Z00.00 ROUTINE GENERAL MEDICAL EXAMINATION AT A HEALTH CARE FACILITY: Primary | ICD-10-CM

## 2025-05-07 DIAGNOSIS — Z85.038 HISTORY OF COLON CANCER: ICD-10-CM

## 2025-05-07 DIAGNOSIS — E78.2 MIXED HYPERLIPIDEMIA: ICD-10-CM

## 2025-05-07 DIAGNOSIS — Z12.5 SCREENING FOR PROSTATE CANCER: ICD-10-CM

## 2025-05-07 PROCEDURE — 1159F MED LIST DOCD IN RCRD: CPT | Performed by: FAMILY MEDICINE

## 2025-05-07 PROCEDURE — 1160F RVW MEDS BY RX/DR IN RCRD: CPT | Performed by: FAMILY MEDICINE

## 2025-05-07 PROCEDURE — 1126F AMNT PAIN NOTED NONE PRSNT: CPT | Performed by: FAMILY MEDICINE

## 2025-05-07 PROCEDURE — 1158F ADVNC CARE PLAN TLK DOCD: CPT | Performed by: FAMILY MEDICINE

## 2025-05-07 PROCEDURE — 1157F ADVNC CARE PLAN IN RCRD: CPT | Performed by: FAMILY MEDICINE

## 2025-05-07 PROCEDURE — 99397 PER PM REEVAL EST PAT 65+ YR: CPT | Performed by: FAMILY MEDICINE

## 2025-05-07 PROCEDURE — 1036F TOBACCO NON-USER: CPT | Performed by: FAMILY MEDICINE

## 2025-05-07 PROCEDURE — 1123F ACP DISCUSS/DSCN MKR DOCD: CPT | Performed by: FAMILY MEDICINE

## 2025-05-07 RX ORDER — ATORVASTATIN CALCIUM 10 MG/1
10 TABLET, FILM COATED ORAL DAILY
Qty: 90 TABLET | Refills: 3 | Status: SHIPPED | OUTPATIENT
Start: 2025-05-07

## 2025-05-07 ASSESSMENT — ENCOUNTER SYMPTOMS
LOSS OF SENSATION IN FEET: 0
OCCASIONAL FEELINGS OF UNSTEADINESS: 0
DEPRESSION: 0

## 2025-05-07 ASSESSMENT — PATIENT HEALTH QUESTIONNAIRE - PHQ9
1. LITTLE INTEREST OR PLEASURE IN DOING THINGS: NOT AT ALL
2. FEELING DOWN, DEPRESSED OR HOPELESS: NOT AT ALL
SUM OF ALL RESPONSES TO PHQ9 QUESTIONS 1 & 2: 0

## 2025-05-07 ASSESSMENT — PAIN SCALES - GENERAL: PAINLEVEL_OUTOF10: 0-NO PAIN

## 2025-05-07 ASSESSMENT — LIFESTYLE VARIABLES
HOW MANY STANDARD DRINKS CONTAINING ALCOHOL DO YOU HAVE ON A TYPICAL DAY: PATIENT DOES NOT DRINK
HOW OFTEN DO YOU HAVE A DRINK CONTAINING ALCOHOL: NEVER
SKIP TO QUESTIONS 9-10: 1
HOW OFTEN DO YOU HAVE SIX OR MORE DRINKS ON ONE OCCASION: NEVER
AUDIT-C TOTAL SCORE: 0

## 2025-05-07 NOTE — PATIENT INSTRUCTIONS
Patient is here for annual physical.  Blood work is very good.  Up to date on colon cancer screening.  Recheck in 1 year - blood work prior.

## 2025-05-07 NOTE — PROGRESS NOTES
Subjective   Patient ID: Behzad Gilmore is a 64 y.o. male who presents for Annual Exam.    Pt is here for physical.  Father past away 3 months ago.  Taking care of mother with dementia.  Still driving for propane.      Cataract surgery 1 month ago and another tomorrow.      Seeing urology - penile implant.  Peyronies treated.  No further follow ups .      Cancer History:  -Type: colon cancer - diagnosed 2017  -Stage:  Remission - colecotmy and chemotherapy.   -Oncologist:- [facility] - Dr. Ortiz (?) and Dr. Collado (?).   -F/U: Doing well.  Colonoscopy next week.  Pt will also be getting EGD.  Pt had CT scan yesterday.      Dyslipidemia  -F/U: Using atorvastatin - doing well.    -Compliance with treatment thus far has been good.                  Review of Systems    Objective   /72   Pulse 86   Temp 37.6 °C (99.7 °F) (Temporal)   Resp 18   Wt 82.8 kg (182 lb 9.6 oz)   SpO2 98%   BMI 24.77 kg/m²     Physical Exam  Vitals reviewed.   Constitutional:       General: He is not in acute distress.  Cardiovascular:      Rate and Rhythm: Normal rate and regular rhythm.   Pulmonary:      Effort: Pulmonary effort is normal.      Breath sounds: No wheezing or rhonchi.   Musculoskeletal:      Right lower leg: No edema.      Left lower leg: No edema.   Lymphadenopathy:      Cervical: No cervical adenopathy.   Neurological:      Mental Status: He is alert.         Assessment/Plan   Diagnoses and all orders for this visit:  Routine general medical examination at a health care facility  History of colon cancer  Mixed hyperlipidemia

## 2025-07-29 ENCOUNTER — TELEPHONE (OUTPATIENT)
Dept: PRIMARY CARE | Facility: CLINIC | Age: 65
End: 2025-07-29
Payer: COMMERCIAL

## 2025-08-05 ENCOUNTER — OFFICE VISIT (OUTPATIENT)
Dept: URGENT CARE | Age: 65
End: 2025-08-05
Payer: MEDICARE

## 2025-08-05 VITALS
BODY MASS INDEX: 24.68 KG/M2 | DIASTOLIC BLOOD PRESSURE: 74 MMHG | WEIGHT: 182 LBS | HEART RATE: 78 BPM | OXYGEN SATURATION: 98 % | RESPIRATION RATE: 16 BRPM | SYSTOLIC BLOOD PRESSURE: 122 MMHG | TEMPERATURE: 97.8 F

## 2025-08-05 DIAGNOSIS — Z11.3 SCREENING FOR STDS (SEXUALLY TRANSMITTED DISEASES): ICD-10-CM

## 2025-08-05 DIAGNOSIS — A59.9 TRICHOMONAS INFECTION: Primary | ICD-10-CM

## 2025-08-05 DIAGNOSIS — R30.0 DYSURIA: ICD-10-CM

## 2025-08-05 LAB
POC APPEARANCE, URINE: ABNORMAL
POC BILIRUBIN, URINE: NEGATIVE
POC BLOOD, URINE: ABNORMAL
POC COLOR, URINE: YELLOW
POC GLUCOSE, URINE: NEGATIVE MG/DL
POC KETONES, URINE: NEGATIVE MG/DL
POC LEUKOCYTES, URINE: NEGATIVE
POC NITRITE,URINE: NEGATIVE
POC PH, URINE: 6 PH
POC PROTEIN, URINE: NEGATIVE MG/DL
POC SPECIFIC GRAVITY, URINE: >=1.03
POC UROBILINOGEN, URINE: 0.2 EU/DL

## 2025-08-05 PROCEDURE — 1159F MED LIST DOCD IN RCRD: CPT | Performed by: PHYSICIAN ASSISTANT

## 2025-08-05 PROCEDURE — 81003 URINALYSIS AUTO W/O SCOPE: CPT | Performed by: PHYSICIAN ASSISTANT

## 2025-08-05 PROCEDURE — 99204 OFFICE O/P NEW MOD 45 MIN: CPT | Performed by: PHYSICIAN ASSISTANT

## 2025-08-05 RX ORDER — SULFAMETHOXAZOLE AND TRIMETHOPRIM 800; 160 MG/1; MG/1
1 TABLET ORAL 2 TIMES DAILY
Qty: 10 TABLET | Refills: 0 | Status: SHIPPED | OUTPATIENT
Start: 2025-08-05 | End: 2025-08-10

## 2025-08-05 RX ORDER — PANTOPRAZOLE SODIUM 40 MG/1
40 TABLET, DELAYED RELEASE ORAL
COMMUNITY
Start: 2025-05-21 | End: 2026-05-16

## 2025-08-05 NOTE — PROGRESS NOTES
Subjective   Patient ID: Behzad Gilmore is a 64 y.o. male. They present today with a chief complaint of std check (Patient is worried about an STD and has pain urinating with some bleeding for a month on and off.).    History of Present Illness  Reports burning with urination, increased urinary frequency, and pink-tinged urine over the past week.  States he has been sexually active with a new partner within the past month.  No known exposures, testicular pain or swelling, redness, swelling, penile drainage, rashes.  Reports remote history of UTI.  Denies abdominal or back pain, nausea, vomiting, fever, history of kidney stones          Past Medical History  Allergies as of 08/05/2025 - Reviewed 08/05/2025   Allergen Reaction Noted    Penicillin Hives and Itching 09/10/2023    Penicillins Hives and Rash 05/19/2009       Prescriptions Prior to Admission[1]     Medical History[2]    Surgical History[3]     reports that he has never smoked. He has never used smokeless tobacco. He reports current alcohol use of about 2.0 standard drinks of alcohol per week. He reports that he does not use drugs.    Review of Systems  Pertinent systems reviewed and were negative unless otherwise stated in HPI.    Objective    Vitals:    08/05/25 1609   BP: 122/74   BP Location: Left arm   Patient Position: Sitting   BP Cuff Size: Adult   Pulse: 78   Resp: 16   Temp: 36.6 °C (97.8 °F)   TempSrc: Oral   SpO2: 98%   Weight: 82.6 kg (182 lb)     No LMP for male patient.    Physical Exam  Constitutional:       General: He is not in acute distress.  HENT:      Mouth/Throat:      Mouth: Mucous membranes are moist.     Eyes:      Conjunctiva/sclera: Conjunctivae normal.       Cardiovascular:      Rate and Rhythm: Normal rate.   Pulmonary:      Effort: Pulmonary effort is normal.   Abdominal:      Palpations: Abdomen is soft.      Tenderness: There is no abdominal tenderness.     Skin:     General: Skin is warm.      Findings: No rash.      Neurological:      Mental Status: Mental status is at baseline.     Psychiatric:         Mood and Affect: Mood normal.         Behavior: Behavior normal.       Results for orders placed or performed in visit on 08/05/25   POCT UA Automated manually resulted    Collection Time: 08/05/25  4:38 PM   Result Value Ref Range    POC Color, Urine Yellow Straw, Yellow, Light-Yellow    POC Appearance, Urine Hazy (A) Clear    POC Glucose, Urine NEGATIVE NEGATIVE mg/dl    POC Bilirubin, Urine NEGATIVE NEGATIVE    POC Ketones, Urine NEGATIVE NEGATIVE mg/dl    POC Specific Gravity, Urine >=1.030 1.005 - 1.035    POC Blood, Urine TRACE-Intact (A) NEGATIVE    POC PH, Urine 6.0 No Reference Range Established PH    POC Protein, Urine NEGATIVE NEGATIVE mg/dl    POC Urobilinogen, Urine 0.2 0.2, 1.0 EU/DL    Poc Nitrite, Urine NEGATIVE NEGATIVE    POC Leukocytes, Urine NEGATIVE NEGATIVE      Diagnostic study results (if any) were reviewed by Eric Pruett PA-C.    Assessment/Plan   Allergies, medications, history, and pertinent labs/EKGs/imaging reviewed by Eric Pruett PA-C.     Medical Decision Making  Low concern for pyelonephritis, nephrolithiasis, prostatitis.  Despite normal UA, UTI more likely given dysuria, hematuria, increased urinary frequency. Patient elected for empiric treatment. May adjust treatment based on culture.  STI testing sent and will treat accordingly if positive    Orders and Diagnoses  Diagnoses and all orders for this visit:  Dysuria  -     POCT UA Automated manually resulted  -     Urine Culture  -     C. trachomatis / N. gonorrhoeae, Amplified, Urogenital  -     Trichomonas vaginalis, Amplified  -     sulfamethoxazole-trimethoprim (Bactrim DS) 800-160 mg tablet; Take 1 tablet by mouth 2 times a day for 5 days.    Medical Admin Record    Disposition: Home    Electronically signed by Eric Pruett PA-C       [1] (Not in a hospital admission)   [2]   Past Medical History:  Diagnosis Date     Allergic 1/1/2010    Anxiety     Colorectal cancer (Multi) 2016    surgery and chemo    Depression 2000    Erectile dysfunction, unspecified erectile dysfunction type     Hyperlipidemia     Peyronie's disease     Vision loss     glasses   [3]   Past Surgical History:  Procedure Laterality Date    APPENDECTOMY  2017    COLON SURGERY  2017    righr ascending colon resection    HERNIA REPAIR  2004    umbilical with mesh    OTHER SURGICAL HISTORY Right 1980    knee arthroscopy    PROSTATE SURGERY  2020    TURP    TONSILLECTOMY  1965    4 yo    VASECTOMY  2004    WISDOM TOOTH EXTRACTION  1980

## 2025-08-05 NOTE — PATIENT INSTRUCTIONS
We will call with results, if positive, in 2-3 days. Do not have sex until appropriate treatment of yourself and your partner is completed. HIV and syphilis blood testing are recommended to complete STD testing. Contact your primary provider for this testing as soon as possible.

## 2025-08-06 RX ORDER — METRONIDAZOLE 500 MG/1
2000 TABLET ORAL ONCE
Qty: 4 TABLET | Refills: 0 | Status: SHIPPED | OUTPATIENT
Start: 2025-08-06 | End: 2025-08-06

## 2025-08-06 NOTE — PROGRESS NOTES
Trichomonas performed performed 8/5 positive.  Called patient to notify.  Discussed no sexual activity until he and partner have both been treated and he has no further symptoms.  Called metronidazole 2000mg PO x1 per CDC guidelines to pharmacy and advised no alcohol while taking.  Discontinue Bactrim as UA was normal, suspect symptoms related to trichomonas infection.

## 2025-08-07 ENCOUNTER — TELEPHONE (OUTPATIENT)
Dept: PRIMARY CARE | Facility: CLINIC | Age: 65
End: 2025-08-07
Payer: COMMERCIAL

## 2025-08-07 LAB
BACTERIA UR CULT: NORMAL
C TRACH RRNA SPEC QL NAA+PROBE: NOT DETECTED
N GONORRHOEA RRNA SPEC QL NAA+PROBE: NOT DETECTED
QUEST GC CT AMPLIFIED (ALWAYS MESSAGE): NORMAL
T VAGINALIS RRNA SPEC QL NAA+PROBE: DETECTED

## 2025-08-08 LAB
HIV 1+2 AB+HIV1 P24 AG SERPL QL IA: NORMAL
HIV 1+2 AB+HIV1 P24 AG SERPL QL IA: NORMAL
T PALLIDUM AB SER QL IA: NORMAL

## 2025-08-11 ENCOUNTER — OFFICE VISIT (OUTPATIENT)
Dept: PRIMARY CARE | Facility: CLINIC | Age: 65
End: 2025-08-11
Payer: COMMERCIAL

## 2025-08-11 ENCOUNTER — TELEPHONE (OUTPATIENT)
Dept: PRIMARY CARE | Facility: CLINIC | Age: 65
End: 2025-08-11

## 2025-08-11 VITALS
OXYGEN SATURATION: 96 % | SYSTOLIC BLOOD PRESSURE: 124 MMHG | WEIGHT: 179.4 LBS | BODY MASS INDEX: 24.33 KG/M2 | TEMPERATURE: 99 F | HEART RATE: 101 BPM | DIASTOLIC BLOOD PRESSURE: 76 MMHG

## 2025-08-11 DIAGNOSIS — D64.9 ANEMIA, UNSPECIFIED TYPE: ICD-10-CM

## 2025-08-11 DIAGNOSIS — K12.0 APHTHOUS ULCER: ICD-10-CM

## 2025-08-11 DIAGNOSIS — T50.905D ADVERSE EFFECT OF DRUG, SUBSEQUENT ENCOUNTER: Primary | ICD-10-CM

## 2025-08-11 DIAGNOSIS — S39.94XD INJURY TO SCROTUM, SUBSEQUENT ENCOUNTER: ICD-10-CM

## 2025-08-11 DIAGNOSIS — N50.89: ICD-10-CM

## 2025-08-11 LAB
HIV 1+2 AB+HIV1 P24 AG SERPL QL IA: NORMAL
HIV 1+2 AB+HIV1 P24 AG SERPL QL IA: NORMAL
T PALLIDUM AB SER QL IA: NEGATIVE

## 2025-08-11 PROCEDURE — 99214 OFFICE O/P EST MOD 30 MIN: CPT | Performed by: FAMILY MEDICINE

## 2025-08-11 PROCEDURE — 1125F AMNT PAIN NOTED PAIN PRSNT: CPT | Performed by: FAMILY MEDICINE

## 2025-08-11 PROCEDURE — 1159F MED LIST DOCD IN RCRD: CPT | Performed by: FAMILY MEDICINE

## 2025-08-11 PROCEDURE — 1036F TOBACCO NON-USER: CPT | Performed by: FAMILY MEDICINE

## 2025-08-11 PROCEDURE — 1160F RVW MEDS BY RX/DR IN RCRD: CPT | Performed by: FAMILY MEDICINE

## 2025-08-11 RX ORDER — VALACYCLOVIR HYDROCHLORIDE 1 G/1
1000 TABLET, FILM COATED ORAL 3 TIMES DAILY
Qty: 21 TABLET | Refills: 0 | Status: SHIPPED | OUTPATIENT
Start: 2025-08-11 | End: 2025-08-18

## 2025-08-11 RX ORDER — NYSTATIN 100000 [USP'U]/ML
6 SUSPENSION ORAL 4 TIMES DAILY
COMMUNITY

## 2025-08-11 RX ORDER — LEVOFLOXACIN 750 MG/1
750 TABLET, FILM COATED ORAL
COMMUNITY
Start: 2025-08-07 | End: 2025-08-17

## 2025-08-11 ASSESSMENT — ENCOUNTER SYMPTOMS
NAUSEA: 1
HEMATURIA: 1
OCCASIONAL FEELINGS OF UNSTEADINESS: 0
LOSS OF SENSATION IN FEET: 0
DYSURIA: 1
FLANK PAIN: 1
DEPRESSION: 0

## 2025-08-11 ASSESSMENT — PAIN SCALES - GENERAL: PAINLEVEL_OUTOF10: 7

## 2025-08-11 ASSESSMENT — PATIENT HEALTH QUESTIONNAIRE - PHQ9
1. LITTLE INTEREST OR PLEASURE IN DOING THINGS: NOT AT ALL
SUM OF ALL RESPONSES TO PHQ9 QUESTIONS 1 & 2: 0
2. FEELING DOWN, DEPRESSED OR HOPELESS: NOT AT ALL

## 2025-08-11 ASSESSMENT — LIFESTYLE VARIABLES
HOW OFTEN DO YOU HAVE SIX OR MORE DRINKS ON ONE OCCASION: NEVER
HOW OFTEN DO YOU HAVE A DRINK CONTAINING ALCOHOL: NEVER
AUDIT-C TOTAL SCORE: 0
SKIP TO QUESTIONS 9-10: 1
HOW MANY STANDARD DRINKS CONTAINING ALCOHOL DO YOU HAVE ON A TYPICAL DAY: PATIENT DOES NOT DRINK

## 2025-08-15 PROBLEM — S39.94XA: Status: ACTIVE | Noted: 2025-08-15

## 2025-08-20 ENCOUNTER — TELEPHONE (OUTPATIENT)
Dept: PRIMARY CARE | Facility: CLINIC | Age: 65
End: 2025-08-20
Payer: COMMERCIAL

## 2025-08-21 ASSESSMENT — ENCOUNTER SYMPTOMS
BOWEL INCONTINENCE: 0
NAUSEA: 0
ABDOMINAL PAIN: 0
SHORTNESS OF BREATH: 0
VERTIGO: 0
FEVER: 0
DIZZINESS: 1
BACK PAIN: 1
NEUROLOGIC COMPLAINT: 1
DIAPHORESIS: 0
PALPITATIONS: 0
CLUMSINESS: 1
FATIGUE: 1
FOCAL SENSORY LOSS: 1
HEADACHES: 1
WEAKNESS: 1
FOCAL WEAKNESS: 1
AURA: 1
NECK PAIN: 1
LIGHT-HEADEDNESS: 0
VOMITING: 0
CONFUSION: 0

## 2025-08-22 ENCOUNTER — OFFICE VISIT (OUTPATIENT)
Dept: PRIMARY CARE | Facility: CLINIC | Age: 65
End: 2025-08-22
Payer: COMMERCIAL

## 2025-08-22 VITALS
WEIGHT: 183.9 LBS | OXYGEN SATURATION: 99 % | DIASTOLIC BLOOD PRESSURE: 66 MMHG | HEART RATE: 73 BPM | BODY MASS INDEX: 24.94 KG/M2 | TEMPERATURE: 99 F | SYSTOLIC BLOOD PRESSURE: 106 MMHG

## 2025-08-22 DIAGNOSIS — M54.12 ACUTE CERVICAL RADICULOPATHY: Primary | ICD-10-CM

## 2025-08-22 DIAGNOSIS — R29.898 LEFT ARM WEAKNESS: ICD-10-CM

## 2025-08-22 PROCEDURE — 1160F RVW MEDS BY RX/DR IN RCRD: CPT | Performed by: FAMILY MEDICINE

## 2025-08-22 PROCEDURE — 99214 OFFICE O/P EST MOD 30 MIN: CPT | Performed by: FAMILY MEDICINE

## 2025-08-22 PROCEDURE — 1125F AMNT PAIN NOTED PAIN PRSNT: CPT | Performed by: FAMILY MEDICINE

## 2025-08-22 PROCEDURE — 1159F MED LIST DOCD IN RCRD: CPT | Performed by: FAMILY MEDICINE

## 2025-08-22 RX ORDER — PREDNISONE 10 MG/1
TABLET ORAL
Qty: 21 TABLET | Refills: 0 | Status: SHIPPED | OUTPATIENT
Start: 2025-08-22 | End: 2025-08-31

## 2025-08-22 RX ORDER — TIZANIDINE 2 MG/1
2-4 TABLET ORAL 2 TIMES DAILY PRN
Qty: 60 TABLET | Refills: 1 | Status: SHIPPED | OUTPATIENT
Start: 2025-08-22 | End: 2025-09-01

## 2025-08-22 RX ORDER — METHOCARBAMOL 750 MG/1
750 TABLET, FILM COATED ORAL EVERY 6 HOURS PRN
COMMUNITY
Start: 2025-08-21 | End: 2025-08-24

## 2025-08-22 RX ORDER — PREDNISONE 20 MG/1
40 TABLET ORAL
COMMUNITY
Start: 2025-08-21 | End: 2025-08-25

## 2025-08-22 ASSESSMENT — ENCOUNTER SYMPTOMS
WEAKNESS: 1
HEADACHES: 1
NECK PAIN: 1
VERTIGO: 0
NAUSEA: 0
FOCAL SENSORY LOSS: 1
FATIGUE: 1
PALPITATIONS: 0
LIGHT-HEADEDNESS: 0
DIAPHORESIS: 0
NEUROLOGIC COMPLAINT: 1
FEVER: 0
VOMITING: 0
DEPRESSION: 0
BACK PAIN: 1
DIZZINESS: 1
OCCASIONAL FEELINGS OF UNSTEADINESS: 0
CONFUSION: 0
AURA: 1
CLUMSINESS: 1
FOCAL WEAKNESS: 1
ABDOMINAL PAIN: 0
BOWEL INCONTINENCE: 0
LOSS OF SENSATION IN FEET: 0
SHORTNESS OF BREATH: 0

## 2025-08-22 ASSESSMENT — PATIENT HEALTH QUESTIONNAIRE - PHQ9
2. FEELING DOWN, DEPRESSED OR HOPELESS: NOT AT ALL
SUM OF ALL RESPONSES TO PHQ9 QUESTIONS 1 & 2: 0
1. LITTLE INTEREST OR PLEASURE IN DOING THINGS: NOT AT ALL

## 2025-08-22 ASSESSMENT — PAIN SCALES - GENERAL: PAINLEVEL_OUTOF10: 7

## 2025-08-22 ASSESSMENT — LIFESTYLE VARIABLES
AUDIT-C TOTAL SCORE: 0
HOW OFTEN DO YOU HAVE A DRINK CONTAINING ALCOHOL: NEVER
HOW OFTEN DO YOU HAVE SIX OR MORE DRINKS ON ONE OCCASION: NEVER
SKIP TO QUESTIONS 9-10: 1
HOW MANY STANDARD DRINKS CONTAINING ALCOHOL DO YOU HAVE ON A TYPICAL DAY: PATIENT DOES NOT DRINK

## 2025-08-27 ENCOUNTER — TELEPHONE (OUTPATIENT)
Dept: PRIMARY CARE | Facility: CLINIC | Age: 65
End: 2025-08-27
Payer: COMMERCIAL

## 2025-08-27 DIAGNOSIS — M54.12 CERVICAL RADICULOPATHY: ICD-10-CM

## 2025-08-28 ENCOUNTER — PATIENT OUTREACH (OUTPATIENT)
Dept: PRIMARY CARE | Facility: CLINIC | Age: 65
End: 2025-08-28
Payer: COMMERCIAL

## 2025-09-02 DIAGNOSIS — M54.12 ACUTE CERVICAL RADICULOPATHY: ICD-10-CM

## 2025-09-02 RX ORDER — TIZANIDINE 2 MG/1
2-4 TABLET ORAL 2 TIMES DAILY PRN
Qty: 360 TABLET | Refills: 0 | Status: SHIPPED | OUTPATIENT
Start: 2025-09-02

## 2025-09-04 ENCOUNTER — TELEPHONE (OUTPATIENT)
Dept: PRIMARY CARE | Facility: CLINIC | Age: 65
End: 2025-09-04
Payer: COMMERCIAL

## 2025-09-07 ENCOUNTER — APPOINTMENT (OUTPATIENT)
Dept: RADIOLOGY | Facility: HOSPITAL | Age: 65
End: 2025-09-07
Payer: COMMERCIAL

## (undated) DEVICE — DRESSING, NON-ADHERENT, TELFA, OUCHLESS, 3 X 8 IN, STERILE

## (undated) DEVICE — ADHESIVE, SKIN, LIQUIBAND EXCEED

## (undated) DEVICE — SYRINGE, LUER LOCK, 12ML

## (undated) DEVICE — GOWN, SURGICAL, SMARTGOWN, XLARGE, STERILE

## (undated) DEVICE — HEMOSTAT, ARISTA, ABSORBABLE, 3 GRAMS

## (undated) DEVICE — SYRINGE, 50 CC, LUER LOCK

## (undated) DEVICE — EVACUATOR, WOUND, SUCTION, CLOSED, JACKSON-PRATT, 100 CC, SILICONE

## (undated) DEVICE — BAG, DRAINAGE, URINARY, URINE METER, INFECTION CONTROL, LF, 350ML

## (undated) DEVICE — LUBRICANT, SURGILUBE, STERILE, 2OZ

## (undated) DEVICE — DRAPE, TOWEL, STERI DRAPE, 17 X 11 IN, PLASTIC, STERILE

## (undated) DEVICE — ASSEMBLY KIT, TITAN STANDARD

## (undated) DEVICE — SUTURE, ETHILON, 2-0, 18 IN, FS, BLACK, BX/12

## (undated) DEVICE — SYSTEM, PENILE IMPLANT, W/6 BLUNT TIP, 12MM STAY HOOKS

## (undated) DEVICE — COUNTER, NEEDLE, FOAM BLOCK, POP-N-COUNT, W/BLADEGUARD, W/ADHESIVE 40 COUNT, RED

## (undated) DEVICE — BANDAGE, GAUZE, CONFORMING, KERLIX, 6 PLY, 4.5 IN X 4.1 YD

## (undated) DEVICE — DRESSING, TRANSPARENT, TEGADERM, 4 X 4-3/4 IN

## (undated) DEVICE — GLOVE, SURGICAL, PROTEXIS PI BLUE W/NEUTHERA, 7.5, PF, LF

## (undated) DEVICE — DRAPE, ISOLATION, BAG, DRAW STRING CLOSURE, STERI DRAPE, LARGE, 20 X 20 IN, DISPOSABLE, STERILE

## (undated) DEVICE — SYRINGE, 60 CC, IRRIGATION, BULB, CONTRO-BULB, PAPER POUCH

## (undated) DEVICE — SYRINGE, 20 CC, LUER LOCK, MONOJECT, W/O CAP, LF

## (undated) DEVICE — DRESSING, GAUZE, FLUFF, 1 PLY, 18 X 36 IN

## (undated) DEVICE — TOWEL PACK, STERILE, 4/PACK, BLUE

## (undated) DEVICE — SOLUTION, PREP, PVP IODINE, FLIP TOP, 4OZ

## (undated) DEVICE — Device

## (undated) DEVICE — DRAIN, WOUND, FLAT, HUBLESS, FULL LENGTH PERFORATION, 10 MM X 20 CM, SILICONE

## (undated) DEVICE — SYRINGE, 50 CC, IRRIGATION, CATHETER TIP, DISPOSABLE, STERILE, LF

## (undated) DEVICE — SUTURE, VICRYL, 2-0, 27 IN, UR-6, VIOLET

## (undated) DEVICE — SUPPORTER, ATHLETIC, ADULT, X-LARGE

## (undated) DEVICE — WOUND SYSTEM, DEBRIDEMENT & CLEANING, O.R DUOPAK

## (undated) DEVICE — DRESSING, ADHESIVE, ISLAND, TELFA, 2 X 3.75 IN, LF

## (undated) DEVICE — SUTURE, MONOCRYL, 3-0, 18 IN, PS2, UNDYED

## (undated) DEVICE — STATLOCK, FOLEY SWIVEL, SILICONE TRICOT

## (undated) DEVICE — TIP, SUCTION, YANKAUER, BULB, ADULT

## (undated) DEVICE — DRAPE, SHEET, U, STERI DRAPE, 47 X 51 IN, DISPOSABLE, STERILE

## (undated) DEVICE — KIT, MINOR, DOUBLE BASIN